# Patient Record
Sex: FEMALE | Employment: OTHER | URBAN - METROPOLITAN AREA
[De-identification: names, ages, dates, MRNs, and addresses within clinical notes are randomized per-mention and may not be internally consistent; named-entity substitution may affect disease eponyms.]

---

## 2018-02-20 ENCOUNTER — TRANSFERRED RECORDS (OUTPATIENT)
Dept: HEALTH INFORMATION MANAGEMENT | Facility: CLINIC | Age: 83
End: 2018-02-20

## 2019-01-21 ENCOUNTER — TRANSFERRED RECORDS (OUTPATIENT)
Dept: HEALTH INFORMATION MANAGEMENT | Facility: CLINIC | Age: 84
End: 2019-01-21

## 2019-01-22 ENCOUNTER — TRANSFERRED RECORDS (OUTPATIENT)
Dept: HEALTH INFORMATION MANAGEMENT | Facility: CLINIC | Age: 84
End: 2019-01-22

## 2019-01-28 ENCOUNTER — PRE VISIT (OUTPATIENT)
Dept: ONCOLOGY | Facility: CLINIC | Age: 84
End: 2019-01-28

## 2019-01-28 NOTE — TELEPHONE ENCOUNTER
"Records (22pages) received from [facility] & faxed to HIM for scanning & also to \"Attn: Dr Mcmanus - [phone]\"  "

## 2019-01-28 NOTE — TELEPHONE ENCOUNTER
RECORDS STATUS - ALL OTHER DIAGNOSIS      RECORDS RECEIVED FROM: [facility] (received via fax)   DATE RECEIVED: 1/23/19   NOTES STATUS DETAILS   OFFICE NOTE from referring provider 1/22/19 In [facility]     OFFICE NOTE from medical oncologist None None     DISCHARGE SUMMARY from hospital None None     DISCHARGE REPORT from the ER None None     OPERATIVE REPORT None None     MEDICATION LIST 1/22/19 In [facility]     CLINICAL TRIAL TREATMENTS TO DATE None None     LABS     PATHOLOGY REPORTS None None     ANYTHING RELATED TO DIAGNOSIS In Epic In Epic     GENONOMIC TESTING     TYPE: None None   IMAGING (NEED IMAGES & REPORT)     CT SCANS None None   MRI 10/05/18 Lumbar  2/8/17 Head [facility]   MAMMO None None   ULTRASOUND 2/8/17 Carotid [facility]   PET None None

## 2019-01-31 ENCOUNTER — ONCOLOGY VISIT (OUTPATIENT)
Dept: ONCOLOGY | Facility: CLINIC | Age: 84
End: 2019-01-31
Attending: INTERNAL MEDICINE
Payer: COMMERCIAL

## 2019-01-31 VITALS
SYSTOLIC BLOOD PRESSURE: 198 MMHG | HEART RATE: 76 BPM | TEMPERATURE: 98.1 F | OXYGEN SATURATION: 97 % | DIASTOLIC BLOOD PRESSURE: 95 MMHG | WEIGHT: 127.2 LBS

## 2019-01-31 DIAGNOSIS — D47.2 MONOCLONAL GAMMOPATHY: ICD-10-CM

## 2019-01-31 DIAGNOSIS — D47.2 MONOCLONAL GAMMOPATHY: Primary | ICD-10-CM

## 2019-01-31 LAB
ALBUMIN SERPL-MCNC: 3.2 G/DL (ref 3.4–5)
ALP SERPL-CCNC: 70 U/L (ref 40–150)
ALT SERPL W P-5'-P-CCNC: 20 U/L (ref 0–50)
ANION GAP SERPL CALCULATED.3IONS-SCNC: 6 MMOL/L (ref 3–14)
AST SERPL W P-5'-P-CCNC: 26 U/L (ref 0–45)
BASOPHILS # BLD AUTO: 0.1 10E9/L (ref 0–0.2)
BASOPHILS NFR BLD AUTO: 1.3 %
BILIRUB SERPL-MCNC: 0.3 MG/DL (ref 0.2–1.3)
BUN SERPL-MCNC: 16 MG/DL (ref 7–30)
CALCIUM SERPL-MCNC: 9 MG/DL (ref 8.5–10.1)
CHLORIDE SERPL-SCNC: 98 MMOL/L (ref 94–109)
CO2 SERPL-SCNC: 31 MMOL/L (ref 20–32)
CREAT SERPL-MCNC: 0.72 MG/DL (ref 0.52–1.04)
DIFFERENTIAL METHOD BLD: ABNORMAL
EOSINOPHIL # BLD AUTO: 0.3 10E9/L (ref 0–0.7)
EOSINOPHIL NFR BLD AUTO: 4.8 %
ERYTHROCYTE [DISTWIDTH] IN BLOOD BY AUTOMATED COUNT: 14.9 % (ref 10–15)
FERRITIN SERPL-MCNC: 8 NG/ML (ref 8–252)
GFR SERPL CREATININE-BSD FRML MDRD: 77 ML/MIN/{1.73_M2}
GLUCOSE SERPL-MCNC: 99 MG/DL (ref 70–99)
HCT VFR BLD AUTO: 34.5 % (ref 35–47)
HGB BLD-MCNC: 10.2 G/DL (ref 11.7–15.7)
IMM GRANULOCYTES # BLD: 0 10E9/L (ref 0–0.4)
IMM GRANULOCYTES NFR BLD: 0.2 %
IRON SATN MFR SERPL: 9 % (ref 15–46)
IRON SERPL-MCNC: 33 UG/DL (ref 35–180)
LDH SERPL L TO P-CCNC: 168 U/L (ref 81–234)
LYMPHOCYTES # BLD AUTO: 1.6 10E9/L (ref 0.8–5.3)
LYMPHOCYTES NFR BLD AUTO: 31 %
MCH RBC QN AUTO: 25.1 PG (ref 26.5–33)
MCHC RBC AUTO-ENTMCNC: 29.6 G/DL (ref 31.5–36.5)
MCV RBC AUTO: 85 FL (ref 78–100)
MONOCYTES # BLD AUTO: 0.4 10E9/L (ref 0–1.3)
MONOCYTES NFR BLD AUTO: 7.9 %
NEUTROPHILS # BLD AUTO: 2.9 10E9/L (ref 1.6–8.3)
NEUTROPHILS NFR BLD AUTO: 54.8 %
NRBC # BLD AUTO: 0 10*3/UL
NRBC BLD AUTO-RTO: 0 /100
PLATELET # BLD AUTO: 267 10E9/L (ref 150–450)
POTASSIUM SERPL-SCNC: 3.7 MMOL/L (ref 3.4–5.3)
PROT SERPL-MCNC: 8.5 G/DL (ref 6.8–8.8)
RBC # BLD AUTO: 4.06 10E12/L (ref 3.8–5.2)
SODIUM SERPL-SCNC: 135 MMOL/L (ref 133–144)
TIBC SERPL-MCNC: 382 UG/DL (ref 240–430)
WBC # BLD AUTO: 5.2 10E9/L (ref 4–11)

## 2019-01-31 PROCEDURE — 83883 ASSAY NEPHELOMETRY NOT SPEC: CPT | Performed by: INTERNAL MEDICINE

## 2019-01-31 PROCEDURE — G0463 HOSPITAL OUTPT CLINIC VISIT: HCPCS | Mod: ZF

## 2019-01-31 PROCEDURE — 82784 ASSAY IGA/IGD/IGG/IGM EACH: CPT | Performed by: INTERNAL MEDICINE

## 2019-01-31 PROCEDURE — 99205 OFFICE O/P NEW HI 60 MIN: CPT | Mod: ZP | Performed by: INTERNAL MEDICINE

## 2019-01-31 PROCEDURE — 82728 ASSAY OF FERRITIN: CPT

## 2019-01-31 PROCEDURE — 83540 ASSAY OF IRON: CPT

## 2019-01-31 PROCEDURE — 83615 LACTATE (LD) (LDH) ENZYME: CPT

## 2019-01-31 PROCEDURE — 85025 COMPLETE CBC W/AUTO DIFF WBC: CPT

## 2019-01-31 PROCEDURE — 83550 IRON BINDING TEST: CPT

## 2019-01-31 PROCEDURE — 84165 PROTEIN E-PHORESIS SERUM: CPT | Performed by: INTERNAL MEDICINE

## 2019-01-31 PROCEDURE — 80053 COMPREHEN METABOLIC PANEL: CPT

## 2019-01-31 PROCEDURE — 00000402 ZZHCL STATISTIC TOTAL PROTEIN: Performed by: INTERNAL MEDICINE

## 2019-01-31 PROCEDURE — 86334 IMMUNOFIX E-PHORESIS SERUM: CPT | Performed by: INTERNAL MEDICINE

## 2019-01-31 RX ORDER — LABETALOL 100 MG/1
100 TABLET, FILM COATED ORAL
COMMUNITY

## 2019-01-31 RX ORDER — MULTIPLE VITAMINS W/ MINERALS TAB 9MG-400MCG
1 TAB ORAL
COMMUNITY

## 2019-01-31 RX ORDER — LATANOPROST 50 UG/ML
1 SOLUTION/ DROPS OPHTHALMIC
COMMUNITY

## 2019-01-31 RX ORDER — DORZOLAMIDE HYDROCHLORIDE AND TIMOLOL MALEATE 20; 5 MG/ML; MG/ML
SOLUTION/ DROPS OPHTHALMIC
COMMUNITY
Start: 2019-01-10

## 2019-01-31 RX ORDER — LOSARTAN POTASSIUM AND HYDROCHLOROTHIAZIDE 12.5; 1 MG/1; MG/1
TABLET ORAL
COMMUNITY
Start: 2019-01-21

## 2019-01-31 RX ORDER — PREDNISONE 5 MG/1
TABLET ORAL
COMMUNITY
Start: 2019-01-15 | End: 2019-06-27

## 2019-01-31 RX ORDER — LORAZEPAM 0.5 MG/1
0.25 TABLET ORAL
COMMUNITY
End: 2019-06-27

## 2019-01-31 SDOH — HEALTH STABILITY: MENTAL HEALTH: HOW OFTEN DO YOU HAVE A DRINK CONTAINING ALCOHOL?: MONTHLY OR LESS

## 2019-01-31 SDOH — HEALTH STABILITY: MENTAL HEALTH: HOW OFTEN DO YOU HAVE 6 OR MORE DRINKS ON ONE OCCASION?: NEVER

## 2019-01-31 SDOH — HEALTH STABILITY: MENTAL HEALTH: HOW MANY STANDARD DRINKS CONTAINING ALCOHOL DO YOU HAVE ON A TYPICAL DAY?: 1 OR 2

## 2019-01-31 ASSESSMENT — PAIN SCALES - GENERAL: PAINLEVEL: MILD PAIN (2)

## 2019-01-31 NOTE — LETTER
RE: Esmer Ruvalcaba  [address] [place] [address]     Dear Colleague,    Thank you for referring your patient, Esmer Ruvalcaba, to the [facility]. Please see a copy of my visit note below.      HEMATOLOGY CLINIC VISIT    Esmer Ruvalcaba is an 83-year-old woman referred by Dr. Maisha Munroe for evaluation of monoclonal gammopathy.  She is accompanied today by her family.    Her past medical history is remarkable primarily for hypertension, hyperlipidemia, coronary artery disease, and esophageal reflux.  In November 2018, she was diagnosed with polymyalgia rheumatica and placed on prednisone.  Although she felt much better after the first week or so, as the prednisone has been tapered she has continued to have persistent symptoms of diffuse aches and pains as well as some fatigue.  She is due to taper off the prednisone completely within the next week.  In addition she was found to be mildly anemic although on review of her labs dating back over the last year or so, her hemoglobin has been stable in the 10.5-11.5 range.  Nonetheless recent testing for underlying causes of anemia included review of her peripheral blood smear which showed rouleaux.  This prompted a serum protein electrophoresis which demonstrated a monoclonal protein measuring 2.0 g/dL.  This was identified as an IgM lambda.  Other than mild anemia, the remainder of her labs are not concerning.  More specifically, she does not have any evidence of renal insufficiency or hypercalcemia.  The rest of her CBC is also normal.    She has diffuse aches and pains but these sound to be long-standing.  Other than this symptom, and fatigue which has been a problem for at least a number of months, she has generally been doing well.  She has been most recently bothered by difficulty with adjustments to her antihypertensive regimen.  Her appetite is not great and she has lost perhaps 5 pounds in the last few months.  She has not had any fevers or  clear evidence of night sweats.  Remainder of a complete review systems is negative.    Physical exam:  She appears to be in generally good health for woman of her age.  She has no palpable lymphadenopathy.  Head and neck, heart, lung, and abdominal exams are all normal.  Liver and spleen are not palpable.  She has no significant lower extremity edema.    Repeat labs are pending at the time of this dictation.      ASSESSMENT / PLAN:  Monoclonal gammopathy (IgM lambda)    Esmer is an 83-year-old woman recently found to have a monoclonal gammopathy.  Given that this is an IgM monoclonal gammopathy, myeloma is unlikely.  A more likely explanation would be a low-grade lymphoma such as Waldenstrom's macroglobulinemia.  Although she has no palpable lymphadenopathy, we will need to proceed with CT scans to further evaluate that.  In addition she likely needs a bone marrow biopsy although if the CT scans show obvious lymphadenopathy, it is possible we might pursue a lymph node biopsy first.    We decided together the best approach here would be for her to get the CT scans done locally and review those results before we make a final decision about the bone marrow biopsy.  I emphasized, however, that I thought a bone marrow biopsy was likely going to be necessary either for diagnostic purposes or for staging.    Once we have all of the laboratory and imaging studies back, we will arrange to see her back to review the findings and determine her diagnosis, as well as the most appropriate treatment options.    During the course of our discussion, she and her family asked a number of good questions, appeared to have a clear understanding of all of things we reviewed, and were all in agreement with the plan outlined above.    Total time 60 minutes, all in counseling and coordination of care.    Tyler Mcmanus MD  Associate Professor of Medicine  Division of Hematology, Oncology, and Transplantation  Director, [facility]

## 2019-01-31 NOTE — NURSING NOTE
Oncology Rooming Note    January 31, 2019 4:44 PM   Esmer Ruvalcaba is a 83 year old female who presents for:    Chief Complaint   Patient presents with     Oncology Clinic Visit     NEW; Multiple Myeloma     Initial Vitals: BP (!) 198/95   Pulse 76   Temp 98.1  F (36.7  C) (Oral)   Wt 57.7 kg (127 lb 3.2 oz)   SpO2 97%  There is no height or weight on file to calculate BMI. There is no height or weight on file to calculate BSA.  Mild Pain (2) Comment: Data Unavailable   No LMP recorded.  Allergies reviewed: Yes  Medications reviewed: Yes    Medications: Medication refills not needed today.  Pharmacy name entered into [employer]: [facility] [address] [place] [address] - [address]    Clinical concerns: No Concerns Yonathan was NOT notified.    10 minutes for nursing intake (face to face time)     An Carballo MA

## 2019-02-01 ENCOUNTER — TELEPHONE (OUTPATIENT)
Dept: ONCOLOGY | Facility: CLINIC | Age: 84
End: 2019-02-01

## 2019-02-01 LAB
ALBUMIN SERPL ELPH-MCNC: 3.9 G/DL (ref 3.7–5.1)
ALPHA1 GLOB SERPL ELPH-MCNC: 0.3 G/DL (ref 0.2–0.4)
ALPHA2 GLOB SERPL ELPH-MCNC: 0.8 G/DL (ref 0.5–0.9)
B-GLOBULIN SERPL ELPH-MCNC: 2.6 G/DL (ref 0.6–1)
GAMMA GLOB SERPL ELPH-MCNC: 0.6 G/DL (ref 0.7–1.6)
KAPPA LC UR-MCNC: 1.03 MG/DL (ref 0.33–1.94)
KAPPA LC/LAMBDA SER: 0.08 {RATIO} (ref 0.26–1.65)
LAMBDA LC SERPL-MCNC: 13.2 MG/DL (ref 0.57–2.63)
M PROTEIN SERPL ELPH-MCNC: 2 G/DL
PROT PATTERN SERPL ELPH-IMP: ABNORMAL

## 2019-02-01 NOTE — TELEPHONE ENCOUNTER
Vania from [facility] called and stated they need demographics and insurance information for pt. If PA is needed for CT would need us to initiate that and get approval before they will schedule pt. Please call back at [phone] once completed, order on hold until they hear back.

## 2019-02-01 NOTE — TELEPHONE ENCOUNTER
Face Sheet & Cmp lab faxed to Vania at [facility] as requested. Also, per [employer] insurance rep, a prior auth for CT is NOT required. This information was also shared w/ Vania at [facility].

## 2019-02-01 NOTE — TELEPHONE ENCOUNTER
As requested by Dr Mcmanus, CT CAP orders sent to fax# [phone] today. Request to complete CT within 1 week & to fax final CT report to MD at fax# [phone]. If images are required by MD, will request at a later date.

## 2019-02-03 NOTE — PROGRESS NOTES
HEMATOLOGY CLINIC VISIT    Esmer Ruvalcaba is an 83-year-old woman referred by Dr. Maisha Munroe for evaluation of monoclonal gammopathy.  She is accompanied today by her family.    Her past medical history is remarkable primarily for hypertension, hyperlipidemia, coronary artery disease, and esophageal reflux.  In November 2018, she was diagnosed with polymyalgia rheumatica and placed on prednisone.  Although she felt much better after the first week or so, as the prednisone has been tapered she has continued to have persistent symptoms of diffuse aches and pains as well as some fatigue.  She is due to taper off the prednisone completely within the next week.  In addition she was found to be mildly anemic although on review of her labs dating back over the last year or so, her hemoglobin has been stable in the 10.5-11.5 range.  Nonetheless recent testing for underlying causes of anemia included review of her peripheral blood smear which showed rouleaux.  This prompted a serum protein electrophoresis which demonstrated a monoclonal protein measuring 2.0 g/dL.  This was identified as an IgM lambda.  Other than mild anemia, the remainder of her labs are not concerning.  More specifically, she does not have any evidence of renal insufficiency or hypercalcemia.  The rest of her CBC is also normal.    She has diffuse aches and pains but these sound to be long-standing.  Other than this symptom, and fatigue which has been a problem for at least a number of months, she has generally been doing well.  She has been most recently bothered by difficulty with adjustments to her antihypertensive regimen.  Her appetite is not great and she has lost perhaps 5 pounds in the last few months.  She has not had any fevers or clear evidence of night sweats.  Remainder of a complete review systems is negative.    Physical exam:  She appears to be in generally good health for woman of her age.  She has no palpable lymphadenopathy.  Head and  neck, heart, lung, and abdominal exams are all normal.  Liver and spleen are not palpable.  She has no significant lower extremity edema.    Repeat labs are pending at the time of this dictation.      ASSESSMENT / PLAN:  Monoclonal gammopathy (IgM lambda)    Esmer is an 83-year-old woman recently found to have a monoclonal gammopathy.  Given that this is an IgM monoclonal gammopathy, myeloma is unlikely.  A more likely explanation would be a low-grade lymphoma such as Waldenstrom's macroglobulinemia.  Although she has no palpable lymphadenopathy, we will need to proceed with CT scans to further evaluate that.  In addition she likely needs a bone marrow biopsy although if the CT scans show obvious lymphadenopathy, it is possible we might pursue a lymph node biopsy first.    We decided together the best approach here would be for her to get the CT scans done locally and review those results before we make a final decision about the bone marrow biopsy.  I emphasized, however, that I thought a bone marrow biopsy was likely going to be necessary either for diagnostic purposes or for staging.    Once we have all of the laboratory and imaging studies back, we will arrange to see her back to review the findings and determine her diagnosis, as well as the most appropriate treatment options.    During the course of our discussion, she and her family asked a number of good questions, appeared to have a clear understanding of all of things we reviewed, and were all in agreement with the plan outlined above.      Total time 60 minutes, all in counseling and coordination of care.        Tyler Mcmanus MD  Associate Professor of Medicine  Division of Hematology, Oncology, and Transplantation  Director, [facility]ot[facility]

## 2019-02-04 ENCOUNTER — TRANSFERRED RECORDS (OUTPATIENT)
Dept: HEALTH INFORMATION MANAGEMENT | Facility: CLINIC | Age: 84
End: 2019-02-04

## 2019-02-04 LAB
IGA SERPL-MCNC: 148 MG/DL (ref 70–380)
IGG SERPL-MCNC: 549 MG/DL (ref 695–1620)
IGM SERPL-MCNC: 2130 MG/DL (ref 60–265)
PROT PATTERN SERPL IFE-IMP: ABNORMAL

## 2019-02-04 NOTE — TELEPHONE ENCOUNTER
[facility] phoned our clinic, per protocol with Imaging at their location,  CT is from with contrast only.   Dr. Mcmanus is ok with this.

## 2019-02-06 ENCOUNTER — TELEPHONE (OUTPATIENT)
Dept: ONCOLOGY | Facility: CLINIC | Age: 84
End: 2019-02-06

## 2019-02-06 DIAGNOSIS — D47.2 MONOCLONAL GAMMOPATHY: Primary | ICD-10-CM

## 2019-02-06 NOTE — TELEPHONE ENCOUNTER
Rani calls back to discuss the plan below. We reviewed lab results & CT results (no adenopathy) and MD's plan to proceed w/ BMBX. Rani states that pt is agreeable to this plan; Scheduling will contact Rani in the next day or so to finalize appts. Rani verbalized understanding of this plan.

## 2019-02-06 NOTE — TELEPHONE ENCOUNTER
I called pt's home# and left a general message requesting a call back so we may discuss Dr Mcmanus's recommendations.     Plan to discuss that pt's CT done at [facility] shows no obvious adenopathy, thus no site to biopsy. MD recommends proceeding with bone marrow biopsy to further investigate pt's monoclonal gammopathy. I left general message to call back to discuss this plan; will task Scheduling now to arrange bmbx w/ MD follow up a few days after.    I called Rani's cell (pt's daughter) but her mailbox is full & I was not able to leave a voicemail. Awaiting call back.

## 2019-02-06 NOTE — TELEPHONE ENCOUNTER
----- Message from Tyler Mcmanus MD sent at 2/6/2019  6:01 AM CST -----  Regarding: RE: CT results  Labs confirmed what we already knew.  CT scan shows no enlarged lymph nodes -- nothing to biopsy.  So, yes... we need to do the bone marrow biopsy.  I put in the orders.    MTR      ----- Message -----  From: Abby Cordoba LPN  Sent: 2/5/2019  10:07 AM  To: Tyler Mcmanus MD  Subject: CT results                                       The results are back, scanned in,  It says MRI, but it really the CT report.  Let me know if you want the images too.    Daughter already calling, would like to know how the additional bloodwork from last week turned out.    Also questioned her sed rate being high, and wondering if that is associated with the probable lymphoma.  I don't see any sed rates tho.      I am guessing you will want the BMBX, but I will need you to place the orders.     Let me know

## 2019-02-14 ENCOUNTER — APPOINTMENT (OUTPATIENT)
Dept: LAB | Facility: CLINIC | Age: 84
End: 2019-02-14
Attending: INTERNAL MEDICINE
Payer: COMMERCIAL

## 2019-02-14 ENCOUNTER — OFFICE VISIT (OUTPATIENT)
Dept: ONCOLOGY | Facility: CLINIC | Age: 84
End: 2019-02-14
Attending: INTERNAL MEDICINE
Payer: COMMERCIAL

## 2019-02-14 VITALS
HEART RATE: 68 BPM | SYSTOLIC BLOOD PRESSURE: 149 MMHG | TEMPERATURE: 98.2 F | DIASTOLIC BLOOD PRESSURE: 87 MMHG | OXYGEN SATURATION: 98 % | RESPIRATION RATE: 16 BRPM | WEIGHT: 129 LBS

## 2019-02-14 DIAGNOSIS — D47.2 MONOCLONAL GAMMOPATHY: ICD-10-CM

## 2019-02-14 LAB
BASOPHILS # BLD AUTO: 0.1 10E9/L (ref 0–0.2)
BASOPHILS NFR BLD AUTO: 1.1 %
DIFFERENTIAL METHOD BLD: ABNORMAL
EOSINOPHIL # BLD AUTO: 0.3 10E9/L (ref 0–0.7)
EOSINOPHIL NFR BLD AUTO: 5.2 %
ERYTHROCYTE [DISTWIDTH] IN BLOOD BY AUTOMATED COUNT: 14.9 % (ref 10–15)
HCT VFR BLD AUTO: 32.9 % (ref 35–47)
HGB BLD-MCNC: 10.3 G/DL (ref 11.7–15.7)
IMM GRANULOCYTES # BLD: 0 10E9/L (ref 0–0.4)
IMM GRANULOCYTES NFR BLD: 0.2 %
LYMPHOCYTES # BLD AUTO: 1.1 10E9/L (ref 0.8–5.3)
LYMPHOCYTES NFR BLD AUTO: 20.4 %
MCH RBC QN AUTO: 25.9 PG (ref 26.5–33)
MCHC RBC AUTO-ENTMCNC: 31.3 G/DL (ref 31.5–36.5)
MCV RBC AUTO: 83 FL (ref 78–100)
MONOCYTES # BLD AUTO: 0.5 10E9/L (ref 0–1.3)
MONOCYTES NFR BLD AUTO: 8.7 %
NEUTROPHILS # BLD AUTO: 3.5 10E9/L (ref 1.6–8.3)
NEUTROPHILS NFR BLD AUTO: 64.4 %
NRBC # BLD AUTO: 0 10*3/UL
NRBC BLD AUTO-RTO: 0 /100
PLATELET # BLD AUTO: 257 10E9/L (ref 150–450)
RBC # BLD AUTO: 3.98 10E12/L (ref 3.8–5.2)
WBC # BLD AUTO: 5.4 10E9/L (ref 4–11)

## 2019-02-14 PROCEDURE — G0463 HOSPITAL OUTPT CLINIC VISIT: HCPCS | Mod: 25

## 2019-02-14 PROCEDURE — 38222 DX BONE MARROW BX & ASPIR: CPT | Mod: ZF | Performed by: NURSE PRACTITIONER

## 2019-02-14 PROCEDURE — 88342 IMHCHEM/IMCYTCHM 1ST ANTB: CPT | Performed by: INTERNAL MEDICINE

## 2019-02-14 PROCEDURE — 00000161 ZZHCL STATISTIC H-SPHEME PROCESS B/S: Performed by: INTERNAL MEDICINE

## 2019-02-14 PROCEDURE — 40000951 ZZHCL STATISTIC BONE MARROW INTERP TC 85097: Performed by: INTERNAL MEDICINE

## 2019-02-14 PROCEDURE — 25000128 H RX IP 250 OP 636: Mod: ZF | Performed by: NURSE PRACTITIONER

## 2019-02-14 PROCEDURE — 88280 CHROMOSOME KARYOTYPE STUDY: CPT | Performed by: INTERNAL MEDICINE

## 2019-02-14 PROCEDURE — 88161 CYTOPATH SMEAR OTHER SOURCE: CPT | Mod: XU | Performed by: INTERNAL MEDICINE

## 2019-02-14 PROCEDURE — 88237 TISSUE CULTURE BONE MARROW: CPT | Performed by: INTERNAL MEDICINE

## 2019-02-14 PROCEDURE — 88305 TISSUE EXAM BY PATHOLOGIST: CPT | Performed by: INTERNAL MEDICINE

## 2019-02-14 PROCEDURE — 88184 FLOWCYTOMETRY/ TC 1 MARKER: CPT | Performed by: INTERNAL MEDICINE

## 2019-02-14 PROCEDURE — 96374 THER/PROPH/DIAG INJ IV PUSH: CPT

## 2019-02-14 PROCEDURE — 88271 CYTOGENETICS DNA PROBE: CPT | Performed by: INTERNAL MEDICINE

## 2019-02-14 PROCEDURE — 88185 FLOWCYTOMETRY/TC ADD-ON: CPT | Performed by: INTERNAL MEDICINE

## 2019-02-14 PROCEDURE — 88264 CHROMOSOME ANALYSIS 20-25: CPT | Performed by: INTERNAL MEDICINE

## 2019-02-14 PROCEDURE — 36592 COLLECT BLOOD FROM PICC: CPT

## 2019-02-14 PROCEDURE — 88275 CYTOGENETICS 100-300: CPT | Performed by: INTERNAL MEDICINE

## 2019-02-14 PROCEDURE — 85025 COMPLETE CBC W/AUTO DIFF WBC: CPT | Performed by: NURSE PRACTITIONER

## 2019-02-14 PROCEDURE — 40000795 ZZHCL STATISTIC DNA PROCESS AND HOLD: Performed by: INTERNAL MEDICINE

## 2019-02-14 PROCEDURE — 88311 DECALCIFY TISSUE: CPT | Performed by: INTERNAL MEDICINE

## 2019-02-14 PROCEDURE — 88341 IMHCHEM/IMCYTCHM EA ADD ANTB: CPT | Performed by: INTERNAL MEDICINE

## 2019-02-14 PROCEDURE — 40000611 ZZHCL STATISTIC MORPHOLOGY W/INTERP HEMEPATH TC 85060: Performed by: INTERNAL MEDICINE

## 2019-02-14 PROCEDURE — 40001004 ZZHCL STATISTIC FLOW INT 9-15 ABY TC 88188: Performed by: INTERNAL MEDICINE

## 2019-02-14 PROCEDURE — 88313 SPECIAL STAINS GROUP 2: CPT | Performed by: INTERNAL MEDICINE

## 2019-02-14 PROCEDURE — 40000424 ZZHCL STATISTIC BONE MARROW CORE PERF TC 38221: Performed by: INTERNAL MEDICINE

## 2019-02-14 PROCEDURE — 00000058 ZZHCL STATISTIC BONE MARROW ASP PERF TC 38220: Performed by: INTERNAL MEDICINE

## 2019-02-14 RX ADMIN — MIDAZOLAM 0.5 MG: 1 INJECTION INTRAMUSCULAR; INTRAVENOUS at 11:10

## 2019-02-14 ASSESSMENT — PAIN SCALES - GENERAL
PAINLEVEL: SEVERE PAIN (7)
PAINLEVEL: NO PAIN (0)

## 2019-02-14 NOTE — LETTER
2/14/2019     RE: Esmer Ruvalcaba  [address]  [place] [address]     Dear Colleague,    Thank you for referring your patient, Esmer Ruvalcaba, to the [facility]. Please see a copy of my visit note below.    .BMT ONC Adult Bone Marrow Biopsy Procedure Note  February 14, 2019  /76 (BP Location: Right arm, Patient Position: Sitting, Cuff Size: Adult Regular)   Pulse 77   Temp 97.6  F (36.4  C) (Oral)   Resp 16   Wt 58.5 kg (129 lb)   SpO2 99%      Learning needs assessment complete within 12 months? YES    DIAGNOSIS: Monoclonal gammopathy     PROCEDURE: Unilateral Bone Marrow Biopsy and Unilateral Aspirate    LOCATION: [facility]    Patient s identification was positively verified by verbal identification and invasive procedure safety checklist was completed. Informed consent was obtained. Following the administration of Midazolam (0.5 mg) as pre-medication, patient was placed in the prone position and prepped and draped in a sterile manner. Approximately 10 cc of 1% Lidocaine was used over the right posterior iliac spine. Following this a 3 mm incision was made. Trephine bone marrow core(s) was (were) obtained from the [facility]. Bone marrow aspirates were obtained from the [facility]. Aspirates were sent for morphology, immunophenotyping, cytogenetics, molecular diagnostics  and research studies. A total of approximately 20 ml of marrow was aspirated. Following this procedure a sterile dressing was applied to the bone marrow biopsy site(s). The patient was placed in the supine position to maintain pressure on the biopsy site. Post-procedure wound care instructions were given.     Complications: NO      Post-procedural pain assessment: 0 out of 10 on the numeric pain rating scale.     Interventions: NO    Procedure performed by: Lisette Millard CNP    Again, thank you for allowing me to participate in the care of your patient.      Sincerely,    SHAE Ross CNP

## 2019-02-14 NOTE — NURSING NOTE
Oncology Rooming Note    February 14, 2019 9:49 AM   Esmer Ruvalcaba is a 83 year old female who presents for:    Chief Complaint   Patient presents with     RECHECK     BMBX     Initial Vitals: /76 (BP Location: Right arm, Patient Position: Sitting, Cuff Size: Adult Regular)   Pulse 77   Temp 97.6  F (36.4  C) (Oral)   Resp 16   Wt 58.5 kg (129 lb)   SpO2 99%  There is no height or weight on file to calculate BMI. There is no height or weight on file to calculate BSA.  Severe Pain (7) Comment: Data Unavailable   No LMP recorded.  Allergies reviewed: Yes  Medications reviewed: Yes    Medications: Medication refills not needed today.  Pharmacy name entered into [employer]: [id number] - [place], [place] - [address]    Clinical concerns: none      8 minutes for nursing intake (face to face time)     Nedra CONOR Red

## 2019-02-14 NOTE — PROGRESS NOTES
.BMT ONC Adult Bone Marrow Biopsy Procedure Note  February 14, 2019  /76 (BP Location: Right arm, Patient Position: Sitting, Cuff Size: Adult Regular)   Pulse 77   Temp 97.6  F (36.4  C) (Oral)   Resp 16   Wt 58.5 kg (129 lb)   SpO2 99%      Learning needs assessment complete within 12 months? YES    DIAGNOSIS: Monoclonal gammopathy     PROCEDURE: Unilateral Bone Marrow Biopsy and Unilateral Aspirate    LOCATION: [facility]    Patient s identification was positively verified by verbal identification and invasive procedure safety checklist was completed. Informed consent was obtained. Following the administration of Midazolam (0.5 mg) as pre-medication, patient was placed in the prone position and prepped and draped in a sterile manner. Approximately 10 cc of 1% Lidocaine was used over the right posterior iliac spine. Following this a 3 mm incision was made. Trephine bone marrow core(s) was (were) obtained from the [facility]. Bone marrow aspirates were obtained from the [facility]. Aspirates were sent for morphology, immunophenotyping, cytogenetics, molecular diagnostics  and research studies. A total of approximately 20 ml of marrow was aspirated. Following this procedure a sterile dressing was applied to the bone marrow biopsy site(s). The patient was placed in the supine position to maintain pressure on the biopsy site. Post-procedure wound care instructions were given.     Complications: NO      Post-procedural pain assessment: 0 out of 10 on the numeric pain rating scale.     Interventions: NO    Procedure performed by: Lisette Millard CNP

## 2019-02-14 NOTE — NURSING NOTE
BMT Teaching Flowsheet    Esmer Ruvalcaba is a 83 year old female  The encounter diagnosis was Monoclonal gammopathy.    Teaching Topic: pre and post care for bone marrow biopsy including signs and symptoms of infection, pain control, and site care. Patient and daughter knows to keep site clean, dry, and intact for 24 hours. They know temp of 100.5 or higher, pain, drainage, edema, ect are all signs of infection and should be seen immediately. Also, they know they can ice the site as well.  All questions and concerns were answered for the patient and her daughter.     Person(s) involved in teaching: Patient  Motivation Level  Asks Questions: Yes  Eager to Learn: Yes  Cooperative: Yes  Receptive (willing/able to accept information): Yes  Any cultural factors/Rastafarian beliefs that may influence understanding or compliance? No    Patient demonstrates understanding of the following:  - Reason for the appointment, diagnosis and treatment plan: Yes  - Knowledge of proper use of medications and conditions for which they are ordered (with special attention to potential side effects or drug interactions): Yes  - Which situations necessitate calling provider and whom to contact: Yes    Teaching concerns addressed: all questions and concerns were answered    Proper use and care of (medical equipment, care aids, etc.) Yes  Pain management techniques: Yes  Patient instructed on hand hygiene: Yes  How and/when to access community resources: Yes    Instructional Materials Used/Given: written and verbal instructions were given to the patient and her daughter        Patient was given Iv Versad per provider order with provider at bedside, call light within reach, bedside wheels locked, and rails up.    Patient supine for 30 minutes following biopsy. After 30 minutes, dressing clean, dry and intact. Vital signs stable. See DOC flow sheets for details. Left ambulatory with family member.    Drug Administration Record    Drug Name:  Versad  Dose: 0.5 mg  Route Administered: IV  NDC#: 7222-2823-12  Amount of waste (mL): 1.5 ml  Reason for waste: provider order

## 2019-02-14 NOTE — NURSING NOTE
Patient had vitals done post BMBX vitas were stable, went over discharge instructions. Patient denied any pain IV was de accessed from the right arm patient tolerated well and understood discharge instructions and was discharged home.     Sylwia Corado MA

## 2019-02-15 LAB — COPATH REPORT: NORMAL

## 2019-02-18 LAB
COPATH REPORT: NORMAL
COPATH REPORT: NORMAL

## 2019-02-22 ENCOUNTER — CARE COORDINATION (OUTPATIENT)
Dept: ONCOLOGY | Facility: CLINIC | Age: 84
End: 2019-02-22

## 2019-02-22 NOTE — PROGRESS NOTES
Spoke with Daughter Rani several times regarding results of bone marrow biopsy, on going pain and plan. Reviewed with daughter the info provide below.    Per Dr. Mcmanus:  The CT scans showed no evidence of lymphoma.     Bone marrow biopsy also shows no evidence of lymphoma or myeloma.     Thus, at this point she has dx of MGUS (extra antibody protein in blood, that doesn't appear to be causing any specific problems).     Unresolved issue:   Labs show that she is iron deficient.  She needs her GI tract evaluated (both upper and lower endoscopies), as this is assumed to be from GI blood loss until proven otherwise.  It is possible that we may find lymphoma in her GI tract, which could be the source of the extra antibody protein, and the iron deficiency.  My plan was to contact the local primary MD to see if she could arrange for the endoscopies, unless patient / family want them done here.     Situation and Plan:  Rani reports Esmer is in a lot of pain but refuses to take anything for it. Reports Ibuprofen causes upset stomach. She was recently on prednisone as prescribed by her PCP for treatment of  Polymyalgia rheumatica.  It was reported there was some improved in her symptoms at the time.  Esmer has been reluctant to take steroids as they make her wired, she also really thought she may have lymphoma and wanted to pursue that treatment/ Diagnosis as priority.  Daughter tells me about SED rate being high and overall concern regarding pain.  Encouraged her to follow up with PCP for management, from Dr. Mcmanus's standpoint there is no evidence to think the MGUS is causing pain.    Discussed the iron Deficiency issue.  Esmer had a colonoscopy in 2015 and again in 2/2018 along with a upper GI endoscopy in 2/2018.  Both exams show nothing of concern. Will discuss with Dr. Mcmanus if repeat scopes are need being that recent ones were just a year ago.  If he still wants to pursue, family would like to have her vs local  provider.     Daughter concerned of mothers QOL with this pain she is in.  Esmer was to fly to [place] but daughter is certain she will cancel due to pain.  Reports Mom was active just a few months ago and now just in not herself.    Follow-up:  Will discuss with Dr. Mcmanus need for repeated scopes vs treatment of iron deficiency.

## 2019-03-11 LAB — COPATH REPORT: NORMAL

## 2019-03-19 LAB — COPATH REPORT: NORMAL

## 2019-03-28 ENCOUNTER — ONCOLOGY VISIT (OUTPATIENT)
Dept: ONCOLOGY | Facility: CLINIC | Age: 84
End: 2019-03-28
Attending: INTERNAL MEDICINE
Payer: COMMERCIAL

## 2019-03-28 VITALS
HEART RATE: 60 BPM | WEIGHT: 127 LBS | TEMPERATURE: 97.9 F | SYSTOLIC BLOOD PRESSURE: 184 MMHG | DIASTOLIC BLOOD PRESSURE: 85 MMHG | OXYGEN SATURATION: 98 %

## 2019-03-28 DIAGNOSIS — D47.2 MONOCLONAL GAMMOPATHY: Primary | ICD-10-CM

## 2019-03-28 PROCEDURE — 99215 OFFICE O/P EST HI 40 MIN: CPT | Mod: ZP | Performed by: INTERNAL MEDICINE

## 2019-03-28 PROCEDURE — G0463 HOSPITAL OUTPT CLINIC VISIT: HCPCS | Mod: ZF

## 2019-03-28 ASSESSMENT — PAIN SCALES - GENERAL: PAINLEVEL: NO PAIN (0)

## 2019-03-28 NOTE — PROGRESS NOTES
HEMATOLOGY CLINIC VISIT    Esmer Ruvalcaba is an 84-year-old woman who returns today for follow-up of IgM lambda monoclonal gammopathy, and to discuss the results of her recent CT scans and bone marrow biopsy.  Please see my initial consultation from January 31, 2019 for details of her history.  She is accompanied today by 2 daughters.    As part of her evaluation, we recommended CT scans and a bone marrow biopsy.  The CT scans were done through her local clinic and showed no evidence of lymphadenopathy or hepatosplenomegaly.  A 1.3 x 1.3 cm groundglass opacity in the left upper lobe was noted, however.  The bone marrow biopsy was performed here on February 14, 2019.  Her marrow was found to be 40% cellular with normal trilineage hematopoiesis and less than 3% polytypic plasma cells.  Flow cytometry revealed no evidence of a plasma cell neoplasm or B-cell lymphoma.  FISH and cytogenetic testing were also normal.    In addition to the monoclonal gammopathy, we also found that she had a mild normocytic anemia,  along with iron deficiency.  Review of labs from her primary clinic indicate that her hemoglobin has been stable over the last year, however.  She had both upper and lower endoscopies performed in February 2018.  The colonoscopy was normal.  Upper endoscopy revealed diffuse gastritis in the stomach.  Biopsies showed mild nonspecific reactive changes but no evidence of dysplasia, malignancy, or H. pylori infection.  She has been taking over-the-counter H2 blocker but continues to have frequent heartburn symptoms.    She returns today feeling much better than when I previously saw her.  Her diffuse musculoskeletal aches and pains have all largely resolved.  She was able to spend some time in [place].  She has no new symptoms to report.  She denies fevers, chills, night sweats.  Her appetite is good and her weight has been stable to slightly increased.  She has chronic heartburn and gastritis symptoms but nothing  new.  No change in her bowel habits.  She has not noticed any dark tarry stools or blood in her stools.  The remainder of her complete review of systems is negative.    She looks well.  Detailed exam was not performed today.    No new labs were obtained here today.      ASSESSMENT / PLAN:  1.  IgM lambda monoclonal gammopathy  2.  1.3 x 1.3 cm groundglass opacity in the left upper lobe seen on CT scan 2/4/19  3.  Iron deficiency anemia likely secondary to chronic gastritis    I explained to Esmer and her daughters that we have not found any evidence of lymphoma, myeloma, or other hematologic malignancy as a cause of her monoclonal gammopathy.  Thus, this is best characterized as an MGUS.  We discussed that the amount of monoclonal protein and that the IgM subtype are a bit unusual for benign MGUS but again, no evidence of an overt malignant process has been found thus far.  We need to continue monitoring this and will plan to recheck her labs in about 2 months.    The 1.3 x 1.3 cm groundglass opacity in the left upper lobe seen on CT scan in early February is likely infectious or inflammatory in nature, but a repeat CT scan in early May would be appropriate for follow-up.  I will defer this to her primary physician.    Her iron deficiency anemia is likely secondary to chronic gastritis.  Given that she had upper and lower endoscopy a year ago with no other findings noted, and the fact that her hemoglobin has not changed in the last year, I do not think there is a new process going on here.  We discussed the possibility of a GI lymphoma but overall that explanation does not fit the clinical picture very well here.  Thus, I recommend more aggressive treatment of her gastritis symptoms.  Would also be reasonable to start her on a daily oral iron tablet and recheck labs in 2-3 months.  Again I will defer this to her primary physician.    We will plan to see her back in approximately 2 months with repeat labs prior.  She  will call with new questions or concerns in the meantime.      Total time 40 minutes, all in counseling and coordination of care.        Tyler Mcmanus MD  Associate Professor of Medicine  Division of Hematology, Oncology, and Transplantation  Director, [facility]

## 2019-03-28 NOTE — LETTER
3/28/2019       RE: Esmer Ruvalcaba  [address] [place] [address]     Dear Colleague,    Thank you for referring your patient, Esmer Ruvalcaba, to the [facility]. Please see a copy of my visit note below.      HEMATOLOGY CLINIC VISIT    Esmer Ruvalcaba is an 84-year-old woman who returns today for follow-up of IgM lambda monoclonal gammopathy, and to discuss the results of her recent CT scans and bone marrow biopsy.  Please see my initial consultation from January 31, 2019 for details of her history.  She is accompanied today by 2 daughters.    As part of her evaluation, we recommended CT scans and a bone marrow biopsy.  The CT scans were done through her local clinic and showed no evidence of lymphadenopathy or hepatosplenomegaly.  A 1.3 x 1.3 cm groundglass opacity in the left upper lobe was noted, however.  The bone marrow biopsy was performed here on February 14, 2019.  Her marrow was found to be 40% cellular with normal trilineage hematopoiesis and less than 3% polytypic plasma cells.  Flow cytometry revealed no evidence of a plasma cell neoplasm or B-cell lymphoma.  FISH and cytogenetic testing were also normal.    In addition to the monoclonal gammopathy, we also found that she had a mild normocytic anemia,  along with iron deficiency.  Review of labs from her primary clinic indicate that her hemoglobin has been stable over the last year, however.  She had both upper and lower endoscopies performed in February 2018.  The colonoscopy was normal.  Upper endoscopy revealed diffuse gastritis in the stomach.  Biopsies showed mild nonspecific reactive changes but no evidence of dysplasia, malignancy, or H. pylori infection.  She has been taking over-the-counter H2 blocker but continues to have frequent heartburn symptoms.    She returns today feeling much better than when I previously saw her.  Her diffuse musculoskeletal aches and pains have all largely resolved.  She was able to spend some  time in [place].  She has no new symptoms to report.  She denies fevers, chills, night sweats.  Her appetite is good and her weight has been stable to slightly increased.  She has chronic heartburn and gastritis symptoms but nothing new.  No change in her bowel habits.  She has not noticed any dark tarry stools or blood in her stools.  The remainder of her complete review of systems is negative.    She looks well.  Detailed exam was not performed today.    No new labs were obtained here today.      ASSESSMENT / PLAN:  1.  IgM lambda monoclonal gammopathy  2.  1.3 x 1.3 cm groundglass opacity in the left upper lobe seen on CT scan 2/4/19  3.  Iron deficiency anemia likely secondary to chronic gastritis    I explained to Esmer and her daughters that we have not found any evidence of lymphoma, myeloma, or other hematologic malignancy as a cause of her monoclonal gammopathy.  Thus, this is best characterized as an MGUS.  We discussed that the amount of monoclonal protein and that the IgM subtype are a bit unusual for benign MGUS but again, no evidence of an overt malignant process has been found thus far.  We need to continue monitoring this and will plan to recheck her labs in about 2 months.    The 1.3 x 1.3 cm groundglass opacity in the left upper lobe seen on CT scan in early February is likely infectious or inflammatory in nature, but a repeat CT scan in early May would be appropriate for follow-up.  I will defer this to her primary physician.    Her iron deficiency anemia is likely secondary to chronic gastritis.  Given that she had upper and lower endoscopy a year ago with no other findings noted, and the fact that her hemoglobin has not changed in the last year, I do not think there is a new process going on here.  We discussed the possibility of a GI lymphoma but overall that explanation does not fit the clinical picture very well here.  Thus, I recommend more aggressive treatment of her gastritis symptoms.   Would also be reasonable to start her on a daily oral iron tablet and recheck labs in 2-3 months.  Again I will defer this to her primary physician.    We will plan to see her back in approximately 2 months with repeat labs prior.  She will call with new questions or concerns in the meantime.      Total time 40 minutes, all in counseling and coordination of care.        Tyler Mcmanus MD  Associate Professor of Medicine  Division of Hematology, Oncology, and Transplantation  Director, [facility]

## 2019-05-23 ENCOUNTER — TELEPHONE (OUTPATIENT)
Dept: ONCOLOGY | Facility: CLINIC | Age: 84
End: 2019-05-23

## 2019-06-19 ENCOUNTER — TRANSFERRED RECORDS (OUTPATIENT)
Dept: HEALTH INFORMATION MANAGEMENT | Facility: CLINIC | Age: 84
End: 2019-06-19

## 2019-06-27 ENCOUNTER — ONCOLOGY VISIT (OUTPATIENT)
Dept: ONCOLOGY | Facility: CLINIC | Age: 84
End: 2019-06-27
Attending: INTERNAL MEDICINE
Payer: COMMERCIAL

## 2019-06-27 VITALS
BODY MASS INDEX: 24.35 KG/M2 | HEIGHT: 61 IN | OXYGEN SATURATION: 97 % | DIASTOLIC BLOOD PRESSURE: 86 MMHG | WEIGHT: 129 LBS | TEMPERATURE: 97.9 F | RESPIRATION RATE: 16 BRPM | SYSTOLIC BLOOD PRESSURE: 160 MMHG | HEART RATE: 79 BPM

## 2019-06-27 DIAGNOSIS — D47.2 MONOCLONAL GAMMOPATHY: Primary | ICD-10-CM

## 2019-06-27 DIAGNOSIS — D50.0 IRON DEFICIENCY ANEMIA DUE TO CHRONIC BLOOD LOSS: ICD-10-CM

## 2019-06-27 PROCEDURE — G0463 HOSPITAL OUTPT CLINIC VISIT: HCPCS

## 2019-06-27 PROCEDURE — 40000114 ZZH STATISTIC NO CHARGE CLINIC VISIT

## 2019-06-27 PROCEDURE — G0463 HOSPITAL OUTPT CLINIC VISIT: HCPCS | Mod: ZF

## 2019-06-27 PROCEDURE — 99215 OFFICE O/P EST HI 40 MIN: CPT | Mod: ZP | Performed by: INTERNAL MEDICINE

## 2019-06-27 RX ORDER — ERGOCALCIFEROL 1.25 MG/1
CAPSULE, LIQUID FILLED ORAL
COMMUNITY
End: 2019-06-27

## 2019-06-27 RX ORDER — PREDNISOLONE ACETATE 10 MG/ML
1 SUSPENSION/ DROPS OPHTHALMIC
COMMUNITY

## 2019-06-27 RX ORDER — FLUOROURACIL 50 MG/ML
SOLUTION TOPICAL
COMMUNITY

## 2019-06-27 RX ORDER — SILICONE ADHESIVE 1.5" X 3"
1-2 SHEET (EA) TOPICAL
COMMUNITY
Start: 2019-05-10 | End: 2019-07-09

## 2019-06-27 RX ORDER — GATIFLOXACIN 5 MG/ML
SOLUTION/ DROPS OPHTHALMIC
Refills: 1 | COMMUNITY
Start: 2019-04-22

## 2019-06-27 RX ORDER — EZETIMIBE 10 MG/1
10 TABLET ORAL
COMMUNITY
End: 2020-06-11

## 2019-06-27 RX ORDER — TRIAMCINOLONE ACETONIDE 1 MG/G
CREAM TOPICAL
COMMUNITY

## 2019-06-27 RX ORDER — SERTRALINE HYDROCHLORIDE 25 MG/1
TABLET, FILM COATED ORAL
Refills: 0 | COMMUNITY
Start: 2019-06-19

## 2019-06-27 RX ORDER — KETOROLAC TROMETHAMINE 5 MG/ML
SOLUTION OPHTHALMIC
Refills: 1 | COMMUNITY
Start: 2019-05-06

## 2019-06-27 ASSESSMENT — MIFFLIN-ST. JEOR: SCORE: 972.52

## 2019-06-27 ASSESSMENT — PAIN SCALES - GENERAL: PAINLEVEL: NO PAIN (0)

## 2019-06-27 NOTE — NURSING NOTE
Oncology Rooming Note    June 27, 2019 4:30 PM   Esmer Ruvalcaba is a 84 year old female who presents for:    No chief complaint on file.    Initial Vitals: /86 (BP Location: Left arm, Patient Position: Sitting, Cuff Size: Adult Regular)   Pulse 79   Temp 97.9  F (36.6  C) (Oral)   Resp 16   Wt 58.5 kg (129 lb)   SpO2 97%  There is no height or weight on file to calculate BMI. There is no height or weight on file to calculate BSA.  No Pain (0) Comment: Data Unavailable   No LMP recorded.  Allergies reviewed: Yes  Medications reviewed: Yes    Medications: Medication refills not needed today.  Pharmacy name entered into [employer]: [id number] [place] [place] - [address]    Clinical concerns: No new concerns. Provider was notified.      Danica Kwok LPN

## 2019-06-27 NOTE — LETTER
6/27/2019       RE: Esmer Ruvalcaba  [address] [place] [address]     Dear Colleague,    Thank you for referring your patient, Esmer Ruvalcaba, to the [facility]. Please see a copy of my visit note below.      HEMATOLOGY CLINIC VISIT    Esmer Ruvalcaba is an 84-year-old woman who returns today for follow-up of IgM lambda monoclonal gammopathy.  She is accompanied today by 2 daughters.  As outlined in my initial consultation from January 2019 and a follow-up visit from March 2019, she was found in November 2018 to have the monoclonal gammopathy.  Work-up including CT scans and bone marrow biopsy revealed no evidence of underlying lymphoma, myeloma, or other hematologic malignancy.    During the course of our evaluation, she was noted to have a 1.3 x 1.3 cm groundglass opacity in the left upper pulmonary lobe.  This was identified in February 2019 and we had previously recommended a follow-up CT scan be performed by her local providers in May 2019.  It does not appear this has been done.  She has no pulmonary symptoms.    She was also found to have iron deficiency anemia felt secondary to chronic gastritis.  Since I last saw her she has been taking liquid iron twice daily and is feeling much better with increased energy.    Overall she continues to feel well.  She has noted no new lumps or bumps.  No new bony pain.  No fevers, night sweats, or weight loss.  The remainder of her complete review of systems is negative.    On physical exam, she looks well.  She is afebrile, blood pressure 160/86, pulse of 79 and regular, respirations unlabored, O2 saturation 97% on room air.  Head and neck exam is normal.  No palpable lymphadenopathy in the neck, supra clavicular areas, axillae.  Lungs clear.  Heart - regular rate and rhythm normal S1-S2 without rub, gallop, or murmur.  Abdomen is benign, with no hepatosplenomegaly.  No lower extremity edema.  No unusual skin rashes or lesions noted.    Recent labs  obtained at her local clinic in late May and mid June 2019 showed normal metabolic panel.  CBC -- white count of 5.0, hemoglobin 12.3, MCV 80.8, platelet count 210,000.  Iron panel was improved with a serum iron of 69, iron binding capacity 371, iron saturation 19%, ferritin 18.7.    Serum protein electrophoresis continues to demonstrate a monoclonal protein which now measures 1.9 g/dL.  Overall this has been stable.      ASSESSMENT / PLAN:  1.  IgM lambda monoclonal gammopathy -- stable since November 2018.  2.  1.3 x 1.3 cm groundglass opacity in the left upper lobe seen on CT scan 2/4/19 -- in need of a follow-up CT scan.  3.  Iron deficiency anemia likely secondary to chronic gastritis -- improved on oral iron.     Overall,Esmer is doing well.  She is feeling better since I last saw her after starting oral iron supplementation and her hemoglobin has increased nicely.      Her monoclonal gammopathy remains stable.  We will continue to monitor this with repeat labs in September 2019 done at her local clinic.  We will ask her to return here for labs and repeat clinical evaluation in December 2019.    The 1.3 x 1.3 groundglass opacity in the left upper pulmonary lobe seen on CT scan in February 2019 is likely infectious or inflammatory in etiology.  However, the radiologist did recommend a follow-up CT scan in 3 months which has not yet been done.  I will again defer this to her local providers, but would recommend a repeat CT scan of the chest be performed.        Tyler Mcmanus MD  Associate Professor of Medicine  Division of Hematology, Oncology, and Transplantation  Director, [facility]

## 2019-07-26 NOTE — PROGRESS NOTES
HEMATOLOGY CLINIC VISIT    Esmer Ruvalcaba is an 84-year-old woman who returns today for follow-up of IgM lambda monoclonal gammopathy.  She is accompanied today by 2 daughters.  As outlined in my initial consultation from January 2019 and a follow-up visit from March 2019, she was found in November 2018 to have the monoclonal gammopathy.  Work-up including CT scans and bone marrow biopsy revealed no evidence of underlying lymphoma, myeloma, or other hematologic malignancy.    During the course of our evaluation, she was noted to have a 1.3 x 1.3 cm groundglass opacity in the left upper pulmonary lobe.  This was identified in February 2019 and we had previously recommended a follow-up CT scan be performed by her local providers in May 2019.  It does not appear this has been done.  She has no pulmonary symptoms.    She was also found to have iron deficiency anemia felt secondary to chronic gastritis.  Since I last saw her she has been taking liquid iron twice daily and is feeling much better with increased energy.    Overall she continues to feel well.  She has noted no new lumps or bumps.  No new bony pain.  No fevers, night sweats, or weight loss.  The remainder of her complete review of systems is negative.    On physical exam, she looks well.  She is afebrile, blood pressure 160/86, pulse of 79 and regular, respirations unlabored, O2 saturation 97% on room air.  Head and neck exam is normal.  No palpable lymphadenopathy in the neck, supra clavicular areas, axillae.  Lungs clear.  Heart - regular rate and rhythm normal S1-S2 without rub, gallop, or murmur.  Abdomen is benign, with no hepatosplenomegaly.  No lower extremity edema.  No unusual skin rashes or lesions noted.    Recent labs obtained at her local clinic in late May and mid June 2019 showed normal metabolic panel.  CBC -- white count of 5.0, hemoglobin 12.3, MCV 80.8, platelet count 210,000.  Iron panel was improved with a serum iron of 69, iron binding  capacity 371, iron saturation 19%, ferritin 18.7.    Serum protein electrophoresis continues to demonstrate a monoclonal protein which now measures 1.9 g/dL.  Overall this has been stable.      ASSESSMENT / PLAN:  1.  IgM lambda monoclonal gammopathy -- stable since November 2018.  2.  1.3 x 1.3 cm groundglass opacity in the left upper lobe seen on CT scan 2/4/19 -- in need of a follow-up CT scan.  3.  Iron deficiency anemia likely secondary to chronic gastritis -- improved on oral iron.     Overall,Esmer is doing well.  She is feeling better since I last saw her after starting oral iron supplementation and her hemoglobin has increased nicely.      Her monoclonal gammopathy remains stable.  We will continue to monitor this with repeat labs in September 2019 done at her local clinic.  We will ask her to return here for labs and repeat clinical evaluation in December 2019.    The 1.3 x 1.3 groundglass opacity in the left upper pulmonary lobe seen on CT scan in February 2019 is likely infectious or inflammatory in etiology.  However, the radiologist did recommend a follow-up CT scan in 3 months which has not yet been done.  I will again defer this to her local providers, but would recommend a repeat CT scan of the chest be performed.        Tyler Mcmanus MD  Associate Professor of Medicine  Division of Hematology, Oncology, and Transplantation  Director, [facility]

## 2019-08-15 ENCOUNTER — TRANSFERRED RECORDS (OUTPATIENT)
Dept: HEALTH INFORMATION MANAGEMENT | Facility: CLINIC | Age: 84
End: 2019-08-15

## 2019-09-13 ENCOUNTER — TELEPHONE (OUTPATIENT)
Dept: ONCOLOGY | Facility: CLINIC | Age: 84
End: 2019-09-13

## 2019-09-13 NOTE — TELEPHONE ENCOUNTER
Left message on Daughter Rani's phone, stating Esmer should go to her local clinic to have labs drawn.  Labs faxed to local clinic [phone],    Recent CT was completed locally with recommendations to repeat in one year  Will review labs and if stable will get labs again in 3 months.  Plan to RTC in June

## 2019-10-31 DIAGNOSIS — D47.2 MONOCLONAL GAMMOPATHY: Primary | ICD-10-CM

## 2019-12-02 ENCOUNTER — TELEPHONE (OUTPATIENT)
Dept: ONCOLOGY | Facility: CLINIC | Age: 84
End: 2019-12-02

## 2019-12-02 NOTE — TELEPHONE ENCOUNTER
Esmer's daughter, Rani, called to ask if they could adjust their appt to December since she has met her deductible and out of pocket for the year. She is also wondering if her mom needs labs done and if she can get those done prior to the end of the year.    Called her back and spoke with her; informed her Dr. Mcmanus's clinic is overbooked at this time and it will most likely not be able to move her appt up. She may be able to move up her labs to the end of the year but it will have to be discussed with Dr. Mcmanus. Plan to have Abby discuss and call her back with a plan of care.

## 2020-01-03 ENCOUNTER — TELEPHONE (OUTPATIENT)
Dept: ONCOLOGY | Facility: CLINIC | Age: 85
End: 2020-01-03

## 2020-01-03 DIAGNOSIS — D50.0 IRON DEFICIENCY ANEMIA DUE TO CHRONIC BLOOD LOSS: Primary | ICD-10-CM

## 2020-01-04 NOTE — TELEPHONE ENCOUNTER
Left voicemail with daughter Rani.  Discussed with Dr. Mcmanus previously this fall.  Esmer does not need to come to clinic ( especially in the winter)  We can have her labs drawn at her local clinic and can monitor.  Plan to RTC in May / June 2020    On 1/13/2020 labs faxed to [phone] and on 1/15 to [phone]

## 2020-01-14 ENCOUNTER — TRANSFERRED RECORDS (OUTPATIENT)
Dept: HEALTH INFORMATION MANAGEMENT | Facility: CLINIC | Age: 85
End: 2020-01-14

## 2020-01-14 LAB
% GRANULOCYTES: 61.8 %
ALBUMIN MFR SERPL ELPH: 46.9 REL % (ref 50–67)
ALBUMIN SERPL-MCNC: 3 G/DL (ref 3.4–5)
ALBUMIN SERPL-MCNC: 3.8 GM/DL
ALBUMIN/GLOB SERPL: 0.9 {RATIO}
ALP SERPL-CCNC: 68 U/L
ALPHA 1-%: 3.4 REL %
ALPHA 1: 0.3 GM/DL
ALPHA 2-%: 9.3 REL %
ALPHA 2: 0.7 GM/DL
ALT SERPL-CCNC: 20 U/L
ANION GAP SERPL CALCULATED.3IONS-SCNC: ABNORMAL MMOL/L
AST SERPL-CCNC: 20 U/L
B-GLOBULIN MFR SERPL ELPH: 33.8 REL % (ref 9–14.7)
B-GLOBULIN SERPL ELPH-MCNC: 2.7 GM/DL (ref 0.6–1.2)
BILIRUB SERPL-MCNC: 0.33 MG/DL
BUN SERPL-MCNC: 21 MG/DL (ref 7–18)
CALCIUM SERPL-MCNC: 9.1 MG/DL
CHLORIDE SERPLBLD-SCNC: 99 MMOL/L
CO2 SERPL-SCNC: 30.5 MMOL/L
CREAT SERPL-MCNC: 0.85 MG/DL
ERYTHROCYTE [DISTWIDTH] IN BLOOD BY AUTOMATED COUNT: 13.8 %
FERRITIN SERPL-MCNC: 8.9 NG/ML (ref 10–291)
FREE TEXT: 2.1 GM/DL (ref 0–0)
FREE TEXT: 26.7 % (ref 0–0)
GAMMA GLOB MFR SERPL ELPH: 6.6 REL % (ref 9.5–20)
GAMMA GLOB SNV ELPH-MCNC: 0.5 GM/DL (ref 0.6–1.6)
GFR SERPL CREATININE-BSD FRML MDRD: 67.58 ML/MIN/1.73M2
GLUCOSE SERPL-MCNC: 96 MG/DL (ref 70–99)
GRANULOCYTES #: 2.8 10^3/UL
HCT VFR BLD AUTO: 32.7 % (ref 35–45)
HEMOGLOBIN: 11.4 G/DL (ref 11.8–15.8)
IGA: 148 MG/DL
IGG: 392 MG/DL (ref 540–1822)
IGM: 3072 MG/DL (ref 22–293)
IRON BINDING CAP: 371 MCG/DL
IRON SATURATION INDEX: 16 %
IRON: 61 MCG/DL
KAPPA FREE LT CHAIN: 1.26 MG/DL
KAPPA/LAMBDA FLC RATIO - HISTORICAL: 0.07 (ref 0.26–1.65)
LAMBDA FREE LT CHAIN: 17.4 MG/DL (ref 0.57–2.63)
LYMPHOCYTES # BLD AUTO: 1.4 10^3/UL
LYMPHOCYTES NFR BLD AUTO: 31.2 %
MCH RBC QN AUTO: 28.6 PG
MCHC RBC AUTO-ENTMCNC: 35 G/DL
MCV RBC AUTO: 81.7 FL
MID #: 0.3 10^3/UL
MID %: 7 % (ref 0.1–24)
PLATELET COUNT - QUEST: 255 10^3/UL (ref 150–450)
PMV BLD: 8.6 FL
POTASSIUM SERPL-SCNC: 4 MMOL/L
PROT SERPL-MCNC: 8.6 G/DL (ref 6.4–8.2)
PROT UR QL STRIP.AUTO: 8 G/DL
RBC # BLD AUTO: 4 10^6/UL
SODIUM SERPL-SCNC: 136 MMOL/L
TRANSFERRIN: 297 MG/DL
WBC # BLD AUTO: 4.5 10^3/UL

## 2020-05-26 ENCOUNTER — PATIENT OUTREACH (OUTPATIENT)
Dept: ONCOLOGY | Facility: CLINIC | Age: 85
End: 2020-05-26

## 2020-05-26 DIAGNOSIS — D50.0 IRON DEFICIENCY ANEMIA DUE TO CHRONIC BLOOD LOSS: Primary | ICD-10-CM

## 2020-05-26 DIAGNOSIS — D47.2 MONOCLONAL GAMMOPATHY: ICD-10-CM

## 2020-05-27 NOTE — PROGRESS NOTES
Lab orders faxed today to local clinic at fax number [phone].  Spoke to Daughter Rani,  Discussed how a video visit occurs.  We will use her phone for video visit and t ext link,, [phone]  Esmer will get labs locally prior to appointment.

## 2020-06-11 ENCOUNTER — VIRTUAL VISIT (OUTPATIENT)
Dept: ONCOLOGY | Facility: CLINIC | Age: 85
End: 2020-06-11
Attending: INTERNAL MEDICINE
Payer: COMMERCIAL

## 2020-06-11 DIAGNOSIS — D47.2 MONOCLONAL GAMMOPATHY: Primary | ICD-10-CM

## 2020-06-11 DIAGNOSIS — D50.0 IRON DEFICIENCY ANEMIA DUE TO CHRONIC BLOOD LOSS: ICD-10-CM

## 2020-06-11 PROCEDURE — 99215 OFFICE O/P EST HI 40 MIN: CPT | Mod: 95 | Performed by: INTERNAL MEDICINE

## 2020-06-11 PROCEDURE — 40000114 ZZH STATISTIC NO CHARGE CLINIC VISIT

## 2020-06-11 NOTE — PROGRESS NOTES
HEMATOLOGY CLINIC VISIT    NOTE:  Due to the ongoing COVID-19 pandemic, this visit was conducted by telephone, with the patient's approval.  The visit was initially scheduled as a video visit, but due to technical difficulties with the platform, it was converted to a telephone visit.  The patient's daughter also participated in the visit.      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  She was found in January 2019 to have the monoclonal gammopathy.  Work-up including CT scans and bone marrow biopsy revealed no evidence of underlying lymphoma, myeloma, or other hematologic malignancy.  At the time of diagnosis, the monoclonal protein measured 2.0 g/dL.  It has not changed significantly since that time, and no specific treatment has been initiated.    She also has a history of iron deficiency anemia felt secondary to chronic gastritis.  This has been treated with oral iron which she has tolerated well and which has improved her symptoms of fatigue.  She notes today, however, that she quit oral iron several months ago as she was not sure she still needed it.    Overall, she feels about the same, or perhaps slightly worse.  She continues to describe intermittent musculoskeletal aches and pains as well as fatigue, both of which have been longstanding.  She thinks the symptoms are worse in the last few months.  She also describes episodes of dizziness which have perhaps increased in frequency over the last few months.  It is difficult to get a clear sense of how often these are happening.  Denies other neurologic symptoms.  She wonders if the dizzy episodes may be related to her blood pressure.  She has a home blood pressure cuff and typically has systolic blood pressures in the 170s to 190s.  She says that her primary care physician is aware of this.    Sleep quality is poor, with awakening 2 or 3 times per night.  This is  longstanding.  Appetite is good.  No new GI symptoms.  No bleeding symptoms.  The remainder of a complete review of systems is negative.    Physical exam: Not done, telephone visit.    Labs: Recent labs obtained through her local clinic show normal liver and renal function, anemia with a hemoglobin of 10.3 and an otherwise unremarkable CBC.  Her baseline hemoglobin has been in the 10-12 range.  The current hemoglobin is toward the lower end of her baseline over the last couple of years.  Iron panel shows a serum iron of 56, iron binding capacity 336, iron saturation 17%, ferritin 13.8.    Serum protein electrophoresis continues to demonstrate a monoclonal protein which now measures 2.4 g/dL.  Total IgM is 3,094.  Over the last 18 months, her monoclonal protein has ranged from 1.9 to 2.4 g/dL, and the total IgM has ranged from approximately 0471-4902.  There is not a clear trend in the upward direction for either of these lab values.      ASSESSMENT / PLAN:  1.  IgM lambda monoclonal gammopathy.  2.  History of iron deficiency anemia likely secondary to chronic gastritis.     Overall, Esmer appears to be doing about the same.  She continues to note intermittent musculoskeletal aches and pains and fatigue, both of which are longstanding symptoms.  She thinks these may be worse over the last few months, although it is difficult to get a clear sense of this.  She is also describing dizzy episodes which may be increase in frequency, although again, I had a difficult time getting a clear sense of how often these are occurring.    It is possible that the symptoms could be related to her underlying IgM monoclonal gammopathy although there is not a clear trend in the upward direction in her laboratory parameters.  I explained to Esmer and her daughter that we could consider treatment with rituximab, but I am uncertain regarding whether or not her current symptoms are related to this monoclonal gammopathy.  She also stopped  oral iron supplementation several months ago and the symptoms could be in part related to iron deficiency as well.  She was somewhat hesitant about proceeding with rituximab unless we could be more certain about whether the symptoms would be improved with that treatment.    Together, we agreed and the following plan:  1.  She will resume oral iron.  2.  She will keep a log of her dizzy episodes.  I asked her to document her blood pressure associated with these episodes.  3.  We will check in with her by phone in 1 month to review her symptoms.  4.  Repeat labs and virtual clinic visit in 3 months.      Total time 40 minutes, all in counseling and coordination of care.      Tyler Mcmanus MD  Associate Professor of Medicine  Division of Hematology, Oncology, and Transplantation  Director, [facility]

## 2020-06-11 NOTE — PROGRESS NOTES
"Esmer Ruvalcaba is a 85 year old female who is being evaluated via a billable video visit.      The patient has been notified of following:     \"This video visit will be conducted via a call between you and your physician/provider. We have found that certain health care needs can be provided without the need for an in-person physical exam.  This service lets us provide the care you need with a video conversation.  If a prescription is necessary we can send it directly to your pharmacy.  If lab work is needed we can place an order for that and you can then stop by our lab to have the test done at a later time.    Video visits are billed at different rates depending on your insurance coverage.  Please reach out to your insurance provider with any questions.    If during the course of the call the physician/provider feels a video visit is not appropriate, you will not be charged for this service.\"    Patient has given verbal consent for Video visit? Yes    How would you like to obtain your AVS? Mail a copy     Patient would like the video invitation sent by: Text to cell phone: [phone]    Will anyone else be joining your video visit? No         I have reviewed and updated the patient's allergies and medication list.    Concerns: No new concerns since referral.  Refills: None needed.      Nadia Peres CMA        Video-Visit Details    Video visit platform did not function properly.  Converted to telephone visit.      "

## 2020-06-11 NOTE — LETTER
"    6/11/2020         RE: Esmer Ruvalcaba  [address] [place] [address]        Dear Colleague,    Thank you for referring your patient, Esmer Ruvalcaba, to the [facility]. Please see a copy of my visit note below.    Esmer Ruvalcaba is a 85 year old female who is being evaluated via a billable video visit.      The patient has been notified of following:     \"This video visit will be conducted via a call between you and your physician/provider. We have found that certain health care needs can be provided without the need for an in-person physical exam.  This service lets us provide the care you need with a video conversation.  If a prescription is necessary we can send it directly to your pharmacy.  If lab work is needed we can place an order for that and you can then stop by our lab to have the test done at a later time.    Video visits are billed at different rates depending on your insurance coverage.  Please reach out to your insurance provider with any questions.    If during the course of the call the physician/provider feels a video visit is not appropriate, you will not be charged for this service.\"    Patient has given verbal consent for Video visit? Yes    How would you like to obtain your AVS? Mail a copy     Patient would like the video invitation sent by: Text to cell phone: [phone]    Will anyone else be joining your video visit? No         I have reviewed and updated the patient's allergies and medication list.    Concerns: No new concerns since referral.  Refills: None needed.      Nadia Peres CMA        Video-Visit Details    Video visit platform did not function properly.  Converted to telephone visit.          HEMATOLOGY CLINIC VISIT    NOTE:  Due to the ongoing COVID-19 pandemic, this visit was conducted by telephone, with the patient's approval.  The visit was initially scheduled as a video visit, but due to technical difficulties with the platform, it was converted to " a telephone visit.  The patient's daughter also participated in the visit.      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  She was found in January 2019 to have the monoclonal gammopathy.  Work-up including CT scans and bone marrow biopsy revealed no evidence of underlying lymphoma, myeloma, or other hematologic malignancy.  At the time of diagnosis, the monoclonal protein measured 2.0 g/dL.  It has not changed significantly since that time, and no specific treatment has been initiated.    She also has a history of iron deficiency anemia felt secondary to chronic gastritis.  This has been treated with oral iron which she has tolerated well and which has improved her symptoms of fatigue.  She notes today, however, that she quit oral iron several months ago as she was not sure she still needed it.    Overall, she feels about the same, or perhaps slightly worse.  She continues to describe intermittent musculoskeletal aches and pains as well as fatigue, both of which have been longstanding.  She thinks the symptoms are worse in the last few months.  She also describes episodes of dizziness which have perhaps increased in frequency over the last few months.  It is difficult to get a clear sense of how often these are happening.  Denies other neurologic symptoms.  She wonders if the dizzy episodes may be related to her blood pressure.  She has a home blood pressure cuff and typically has systolic blood pressures in the 170s to 190s.  She says that her primary care physician is aware of this.    Sleep quality is poor, with awakening 2 or 3 times per night.  This is longstanding.  Appetite is good.  No new GI symptoms.  No bleeding symptoms.  The remainder of a complete review of systems is negative.    Physical exam: Not done, telephone visit.    Labs: Recent labs obtained through her local clinic show normal liver and renal function,  anemia with a hemoglobin of 10.3 and an otherwise unremarkable CBC.  Her baseline hemoglobin has been in the 10-12 range.  The current hemoglobin is toward the lower end of her baseline over the last couple of years.  Iron panel shows a serum iron of 56, iron binding capacity 336, iron saturation 17%, ferritin 13.8.    Serum protein electrophoresis continues to demonstrate a monoclonal protein which now measures 2.4 g/dL.  Total IgM is 3,094.  Over the last 18 months, her monoclonal protein has ranged from 1.9 to 2.4 g/dL, and the total IgM has ranged from approximately 1893-2921.  There is not a clear trend in the upward direction for either of these lab values.      ASSESSMENT / PLAN:  1.  IgM lambda monoclonal gammopathy.  2.  History of iron deficiency anemia likely secondary to chronic gastritis.     Overall, Esmer appears to be doing about the same.  She continues to note intermittent musculoskeletal aches and pains and fatigue, both of which are longstanding symptoms.  She thinks these may be worse over the last few months, although it is difficult to get a clear sense of this.  She is also describing dizzy episodes which may be increase in frequency, although again, I had a difficult time getting a clear sense of how often these are occurring.    It is possible that the symptoms could be related to her underlying IgM monoclonal gammopathy although there is not a clear trend in the upward direction in her laboratory parameters.  I explained to Esmer and her daughter that we could consider treatment with rituximab, but I am uncertain regarding whether or not her current symptoms are related to this monoclonal gammopathy.  She also stopped oral iron supplementation several months ago and the symptoms could be in part related to iron deficiency as well.  She was somewhat hesitant about proceeding with rituximab unless we could be more certain about whether the symptoms would be improved with that  treatment.    Together, we agreed and the following plan:  1.  She will resume oral iron.  2.  She will keep a log of her dizzy episodes.  I asked her to document her blood pressure associated with these episodes.  3.  We will check in with her by phone in 1 month to review her symptoms.  4.  Repeat labs and virtual clinic visit in 3 months.      Total time 40 minutes, all in counseling and coordination of care.      Tyler Mcmanus MD  Associate Professor of Medicine  Division of Hematology, Oncology, and Transplantation  Director, [facility]

## 2020-12-01 ENCOUNTER — PATIENT OUTREACH (OUTPATIENT)
Dept: ONCOLOGY | Facility: CLINIC | Age: 85
End: 2020-12-01

## 2020-12-01 DIAGNOSIS — D47.2 MONOCLONAL GAMMOPATHY: Primary | ICD-10-CM

## 2020-12-01 NOTE — PROGRESS NOTES
Daughter Rani calls with an update on Esmer.  Reports recently had labs at PCP locally and noted TSH is 4+ and protein is high at 9+, hgb 10+   Reports Esmer to being exteremly tired.  PCP has no concerns.    Will fax lab orders to local clinic to have drawn at their discretion.  Fax [phone]

## 2020-12-04 ENCOUNTER — TRANSFERRED RECORDS (OUTPATIENT)
Dept: HEALTH INFORMATION MANAGEMENT | Facility: CLINIC | Age: 85
End: 2020-12-04

## 2021-01-21 ENCOUNTER — VIRTUAL VISIT (OUTPATIENT)
Dept: ONCOLOGY | Facility: CLINIC | Age: 86
End: 2021-01-21
Attending: INTERNAL MEDICINE
Payer: COMMERCIAL

## 2021-01-21 DIAGNOSIS — D50.8 OTHER IRON DEFICIENCY ANEMIA: ICD-10-CM

## 2021-01-21 DIAGNOSIS — D47.2 MONOCLONAL GAMMOPATHY: Primary | ICD-10-CM

## 2021-01-21 PROCEDURE — 99215 OFFICE O/P EST HI 40 MIN: CPT | Mod: 95 | Performed by: INTERNAL MEDICINE

## 2021-01-21 PROCEDURE — 999N001193 HC VIDEO/TELEPHONE VISIT; NO CHARGE

## 2021-01-21 RX ORDER — HEPARIN SODIUM,PORCINE 10 UNIT/ML
5 VIAL (ML) INTRAVENOUS
Status: CANCELLED | OUTPATIENT
Start: 2021-01-21

## 2021-01-21 RX ORDER — HEPARIN SODIUM (PORCINE) LOCK FLUSH IV SOLN 100 UNIT/ML 100 UNIT/ML
5 SOLUTION INTRAVENOUS
Status: CANCELLED | OUTPATIENT
Start: 2021-01-21

## 2021-01-21 NOTE — LETTER
1/21/2021         RE: Esmer Ruvalcaba  [address] [place] [address]        Dear Colleague,    Thank you for referring your patient, Esmer Ruvalcaba, to the [facility]. Please see a copy of my visit note below.    Esmer is a 85 year old who is being evaluated via a billable video visit.      How would you like to obtain your AVS? MyChart  If the video visit is dropped, the invitation should be resent by: Send to e-mail at: [email or web address]  Will anyone else be joining your video visit? No        Video-Visit Details    Type of service:  Video Visit    Originating Location (pt. Location): home    Distant Location (provider location):  office    Platform used for Video Visit: Priscila Darnell CMA on 1/21/2021 at 5:04 PM        HEMATOLOGY CLINIC VISIT    NOTE:  Due to the ongoing COVID-19 pandemic, this visit was conducted by video, with the patient's approval. The patient's daughters also participated in the visit.      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  She was found in January 2019 to have the monoclonal gammopathy.  Work-up including CT scans and bone marrow biopsy revealed no evidence of underlying lymphoma, myeloma, or other hematologic malignancy.  At the time of diagnosis, the monoclonal protein measured 2.0 g/dL.  Thus far, she has not required treatment.    She also has a history of iron deficiency anemia felt secondary to chronic gastritis.  She has been on oral iron intermittently over the years with variable success.  She seems to tolerate it without any GI side effects although she is currently taking a low dose (liquid iron supplement twice weekly, along with a daily multivitamin).    Overall, she feels about the same.  Her main complaints are of diffuse musculoskeletal pains and fatigue, both of which are longstanding.  She describes her energy level as  "\"terrible.\"  Her sleep quality is poor, awakening 2 or 3 times per night due to joint pain.  She is napping during the day and does not have enough energy to do the things that she would like to do.  Her appetite is good and her weight is stable.  She has no new GI or  symptoms.  She denies any difficulty with her breathing.  No anginal symptoms.  She complains of poor memory, but this is also not a new issue.  No other specific neurologic complaints.  No fevers or night sweats.  No infection propensity.    Physical exam: Detailed exam not performed (video visit).    Labs: Recent labs obtained through her local clinic in early December 2020 were reviewed.    CBC shows a white count of 4.5 with a normal differential, hemoglobin 10.2, MCV 80.7, platelet count 237,000.  Over the last 2 to 3 years her hemoglobin has ranged from approximately 10.5-12, but there seems to be a trend in the downward direction over the last 18 months.  The borderline microcytosis is not new.  Chemistry panel shows normal liver and renal function.    Serum protein electrophoresis continues to demonstrate the presence of a monoclonal protein, currently measuring 2.8 g/dL.  This has been stable at approximately 2.0 from the time it was discovered in January 2019, but over the last year there is a trend of the upward direction with values of 2.1 in January 2020, 2.4 in June 2020, and now 2.8 in December 2020.    Total IgM level is currently 3,942.  This compares with previous values of 3,094 in June 2020, and 3,072 in January 2020.    Iron panel is consistent with iron deficiency with a ferritin of 10.4 and iron saturation of 20%.  She has not had a serum ferritin level greater than 20 in the last 2 years.      ASSESSMENT / PLAN:  1.  IgM lambda monoclonal gammopathy.  2.  Iron deficiency anemia.     Overall, Esmer appears to be doing about the same.  She continues to experience diffuse musculoskeletal pains and chronic fatigue.  The " musculoskeletal pains are felt to be due to diffuse osteoarthritis and this is a very longstanding issue.  She denies other symptoms of iron deficiency such as pica or restless leg syndrome, but it is very likely that at least part, if not all, of her severe fatigue is related to longstanding iron deficiency.    It is unrealistic to think that we will correct her iron deficiency with ongoing oral iron replacement.  We discussed parenteral iron replacement in the past and did so again today.  She is now ready to proceed with this.  We discussed the available preparations and their different dosing schedules.  For the advantage of being able to give the total replacement dose in 1 infusion, we will arrange for her to receive low molecular weight iron dextran (Infed).    It is possible that the symptoms could be related to her underlying IgM monoclonal gammopathy.  Her total IgM and monoclonal protein have been trending up over the last year.  Aside from fatigue, however, she does not appear to have any other symptoms directly attributable to the monoclonal gammopathy.  More specifically, I was unable to elicit any symptoms suggestive of hyperviscosity.  We have previously discussed treatment with rituximab but due to uncertainty about whether or not this would make her feel any better, we have held off on that.    We discussed a plan today of treating her iron deficiency and then reevaluating.  We will plan to recheck an iron panel along with reassessment of her monoclonal gammopathy 3 to 4 weeks after the iron infusion.    Total time 40 minutes, all in counseling and coordination of care, including coordination of IV iron to be done in her local infusion center.      Tyler Mcmanus MD  Associate Professor of Medicine  Division of Hematology, Oncology, and Transplantation  Director, [facility]

## 2021-01-21 NOTE — PROGRESS NOTES
Esmer is a 85 year old who is being evaluated via a billable video visit.      How would you like to obtain your AVS? MyChart  If the video visit is dropped, the invitation should be resent by: Send to e-mail at: [email or web address]  Will anyone else be joining your video visit? No        Video-Visit Details    Type of service:  Video Visit    Originating Location (pt. Location): home    Distant Location (provider location):  office    Platform used for Video Visit: Priscila Darnell CMA on 1/21/2021 at 5:04 PM

## 2021-01-22 NOTE — PROGRESS NOTES
"  HEMATOLOGY CLINIC VISIT    NOTE:  Due to the ongoing COVID-19 pandemic, this visit was conducted by video, with the patient's approval. The patient's daughters also participated in the visit.      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  She was found in January 2019 to have the monoclonal gammopathy.  Work-up including CT scans and bone marrow biopsy revealed no evidence of underlying lymphoma, myeloma, or other hematologic malignancy.  At the time of diagnosis, the monoclonal protein measured 2.0 g/dL.  Thus far, she has not required treatment.    She also has a history of iron deficiency anemia felt secondary to chronic gastritis.  She has been on oral iron intermittently over the years with variable success.  She seems to tolerate it without any GI side effects although she is currently taking a low dose (liquid iron supplement twice weekly, along with a daily multivitamin).    Overall, she feels about the same.  Her main complaints are of diffuse musculoskeletal pains and fatigue, both of which are longstanding.  She describes her energy level as \"terrible.\"  Her sleep quality is poor, awakening 2 or 3 times per night due to joint pain.  She is napping during the day and does not have enough energy to do the things that she would like to do.  Her appetite is good and her weight is stable.  She has no new GI or  symptoms.  She denies any difficulty with her breathing.  No anginal symptoms.  She complains of poor memory, but this is also not a new issue.  No other specific neurologic complaints.  No fevers or night sweats.  No infection propensity.    Physical exam: Detailed exam not performed (video visit).    Labs: Recent labs obtained through her local clinic in early December 2020 were reviewed.    CBC shows a white count of 4.5 with a normal differential, hemoglobin 10.2, MCV 80.7, platelet count 237,000.  Over the " last 2 to 3 years her hemoglobin has ranged from approximately 10.5-12, but there seems to be a trend in the downward direction over the last 18 months.  The borderline microcytosis is not new.  Chemistry panel shows normal liver and renal function.    Serum protein electrophoresis continues to demonstrate the presence of a monoclonal protein, currently measuring 2.8 g/dL.  This has been stable at approximately 2.0 from the time it was discovered in January 2019, but over the last year there is a trend of the upward direction with values of 2.1 in January 2020, 2.4 in June 2020, and now 2.8 in December 2020.    Total IgM level is currently 3,942.  This compares with previous values of 3,094 in June 2020, and 3,072 in January 2020.    Iron panel is consistent with iron deficiency with a ferritin of 10.4 and iron saturation of 20%.  She has not had a serum ferritin level greater than 20 in the last 2 years.      ASSESSMENT / PLAN:  1.  IgM lambda monoclonal gammopathy.  2.  Iron deficiency anemia.     Overall, Esmer appears to be doing about the same.  She continues to experience diffuse musculoskeletal pains and chronic fatigue.  The musculoskeletal pains are felt to be due to diffuse osteoarthritis and this is a very longstanding issue.  She denies other symptoms of iron deficiency such as pica or restless leg syndrome, but it is very likely that at least part, if not all, of her severe fatigue is related to longstanding iron deficiency.    It is unrealistic to think that we will correct her iron deficiency with ongoing oral iron replacement.  We discussed parenteral iron replacement in the past and did so again today.  She is now ready to proceed with this.  We discussed the available preparations and their different dosing schedules.  For the advantage of being able to give the total replacement dose in 1 infusion, we will arrange for her to receive low molecular weight iron dextran (Infed).    It is possible that  the symptoms could be related to her underlying IgM monoclonal gammopathy.  Her total IgM and monoclonal protein have been trending up over the last year.  Aside from fatigue, however, she does not appear to have any other symptoms directly attributable to the monoclonal gammopathy.  More specifically, I was unable to elicit any symptoms suggestive of hyperviscosity.  We have previously discussed treatment with rituximab but due to uncertainty about whether or not this would make her feel any better, we have held off on that.    We discussed a plan today of treating her iron deficiency and then reevaluating.  We will plan to recheck an iron panel along with reassessment of her monoclonal gammopathy 3 to 4 weeks after the iron infusion.    Total time 40 minutes, all in counseling and coordination of care, including coordination of IV iron to be done in her local infusion center.      Tyler Mcmanus MD  Associate Professor of Medicine  Division of Hematology, Oncology, and Transplantation  Director, [facility]

## 2021-02-09 RX ORDER — HEPARIN SODIUM,PORCINE 10 UNIT/ML
5 VIAL (ML) INTRAVENOUS
Status: CANCELLED | OUTPATIENT
Start: 2021-02-09

## 2021-02-09 RX ORDER — HEPARIN SODIUM (PORCINE) LOCK FLUSH IV SOLN 100 UNIT/ML 100 UNIT/ML
5 SOLUTION INTRAVENOUS
Status: CANCELLED | OUTPATIENT
Start: 2021-02-09

## 2021-03-22 ENCOUNTER — PATIENT OUTREACH (OUTPATIENT)
Dept: ONCOLOGY | Facility: CLINIC | Age: 86
End: 2021-03-22

## 2021-03-22 DIAGNOSIS — D47.2 MONOCLONAL GAMMOPATHY: Primary | ICD-10-CM

## 2021-03-22 DIAGNOSIS — D50.0 IRON DEFICIENCY ANEMIA DUE TO CHRONIC BLOOD LOSS: ICD-10-CM

## 2021-03-24 NOTE — PROGRESS NOTES
Esmer had injectafer on 2.22.2021 and had a post infusion hypertensive reaction.  She called writer a few days later to discuss.    See [employer] in care everywhere  Patient's blood pressure was slightly elevated prior to Injectafer administration (167/93). After infusion monitoring wait time patient's blood pressure increased from 194/95 to 202/99 and finally at 200/107. Injectafer can cause a transient increase in systolic blood pressure. Pharmacist called over to [facility] and spoke with nurse Jo Ann for the on call provider Dr. Smith as Dr. Maisha Munroe is off today. Will send the patient over to the clinic for assessment due to her high blood pressure. Patient and her granddaughter updated on plan of care and verbalized understanding, they will head straight over to the clinic from here.    Esmer was obseved and released. Was told to take an extra half dose of her  Labetalol.    She does not want to take the second course of injectafer.  This was her first time receiving IV iron.  The infusion center does not administer INFed,  However she could try another formulation such as Venofer, and this will be discussed at her upcoming appointment.      She will get repeat labs prior to her appointment and will discuss at appointment.    03.24.21  Daughter Rani called as lab orders were not faxed to [facility].  We also decided to move Esmer's appointment so that her labs could be resulted in time for appointment.       Orders faxed 03.25.21 to [phone]

## 2021-05-24 ENCOUNTER — PATIENT OUTREACH (OUTPATIENT)
Dept: ONCOLOGY | Facility: CLINIC | Age: 86
End: 2021-05-24

## 2021-05-24 DIAGNOSIS — D47.2 MONOCLONAL GAMMOPATHY: Primary | ICD-10-CM

## 2021-06-02 NOTE — PROGRESS NOTES
"Esmer is a 86 year old who is being evaluated via a billable video visit.      How would you like to obtain your AVS? MyChart  If the video visit is dropped, the invitation should be resent by: Send to e-mail at: [email or web address]  Will anyone else be joining your video visit? No      Video-Visit Details    Type of service:  Video Visit    Video call duration 30 min    Originating Location (pt. Location): Home    Distant Location (provider location):  [facility]     Platform used for Video Visit: Seakeeper    -------------------------------------------------------------------    06/03/2021    HEMATOLOGY CLINIC VISIT    NOTE:  Due to the ongoing COVID-19 pandemic, this visit was conducted by video, with the patient's approval.      Esmer Ruvalcaba is an 86-year-old woman who was scheduled for a routine follow-up visit today for IgM lambda monoclonal gammopathy.  She was found in January 2019 to have the monoclonal gammopathy.  Work-up including CT scans and bone marrow biopsy revealed no evidence of underlying lymphoma, myeloma, or other hematologic malignancy.  At the time of diagnosis, the monoclonal protein measured 2.0 g/dL.  Thus far, she has not required treatment.    She also has a history of iron deficiency anemia felt secondary to chronic gastritis.  She has been on oral iron intermittently over the years with variable success.  She seems to tolerate it without any GI side effects although she is currently taking a low dose (liquid iron supplement twice weekly, along with a daily multivitamin).    She is evaluated by video visit. Her daughter is also present. Overall, she feels about the same.  She has received Injectafer in February of 2021, resulting in posttransfusion HTN-sive reaction. Still endorses diffuse musculoskeletal pains and \"terrible\" fatigue. She had a dexa scan recently and was started on Vitamin D/Calcium around 2 weeks ago. She denies SOB, CP/AP, change in bowel " or urinary habits.       Physical exam: Detailed exam not performed (video visit).    Labs: Recent labs obtained through her local clinic in were reviewed.    CBC shows a white count of 4.5 with a normal differential, hemoglobin 11.1, MCV 83.6, platelet count 214,000.  Over the last 2 to 3 years her hemoglobin has ranged from approximately 10.5-12.  Chemistry panel shows normal liver and renal function, but elevated Calcium of 10.2.    Serum protein electrophoresis             Total IgM level is currently 4,441.        Note that the markedly elevated IgM level from March 2021 was felt to be lab error.        ASSESSMENT / PLAN:  1.  IgM lambda monoclonal gammopathy.  2. Hypercalcemia    She now has an elevated calcium levels, which raises concern about either iatrogenic elevation from recent initiation of calcium supplementation versus hyperparathyroidism versus progression of her monoclonal gammopathy.    We will ask her to hold Vitamin D/Calcium for next 2 weeks and obtain repeat labs.      Discussed with Dr. Yonathan Guo MD  Hem/Onc fellow       HEMATOLOGY STAFF:  Seen with fellow, whose note reflects our joint evaluation, assessment, and plan.    As noted above, the recent elevation in calcium is concerning, particularly since her monoclonal protein has been drifting up over the last year.  Before we do additional extensive work-up, we will first ask her to hold her calcium supplement and recheck labs in 2 weeks.    Total time 40 minutes, including review of medical records and labs, video visit, and documentation.      Tyler Mcmanus MD  Associate Professor of Medicine  Division of Hematology, Oncology, and Transplantation  Director, [facility]

## 2021-06-03 ENCOUNTER — VIRTUAL VISIT (OUTPATIENT)
Dept: ONCOLOGY | Facility: CLINIC | Age: 86
End: 2021-06-03
Attending: INTERNAL MEDICINE
Payer: COMMERCIAL

## 2021-06-03 DIAGNOSIS — D47.2 MONOCLONAL GAMMOPATHY: Primary | ICD-10-CM

## 2021-06-03 PROCEDURE — 999N001193 HC VIDEO/TELEPHONE VISIT; NO CHARGE

## 2021-06-03 PROCEDURE — 99215 OFFICE O/P EST HI 40 MIN: CPT | Mod: 95 | Performed by: INTERNAL MEDICINE

## 2021-06-03 NOTE — LETTER
6/3/2021         RE: Esmer Ruvalcaba  [address] [place] [address]        Dear Colleague,    Thank you for referring your patient, Esmer Ruvalcaba, to the [facility]. Please see a copy of my visit note below.    Esmer is a 86 year old who is being evaluated via a billable video visit.      How would you like to obtain your AVS? MyChart  If the video visit is dropped, the invitation should be resent by: Send to e-mail at: [email or web address]  Will anyone else be joining your video visit? No      Video-Visit Details    Type of service:  Video Visit    Video call duration 30 min    Originating Location (pt. Location): Home    Distant Location (provider location):  [facility]     Platform used for Video Visit: Twenga    -------------------------------------------------------------------    06/03/2021    HEMATOLOGY CLINIC VISIT    NOTE:  Due to the ongoing COVID-19 pandemic, this visit was conducted by video, with the patient's approval.      Esmer Ruvalcaba is an 86-year-old woman who was scheduled for a routine follow-up visit today for IgM lambda monoclonal gammopathy.  She was found in January 2019 to have the monoclonal gammopathy.  Work-up including CT scans and bone marrow biopsy revealed no evidence of underlying lymphoma, myeloma, or other hematologic malignancy.  At the time of diagnosis, the monoclonal protein measured 2.0 g/dL.  Thus far, she has not required treatment.    She also has a history of iron deficiency anemia felt secondary to chronic gastritis.  She has been on oral iron intermittently over the years with variable success.  She seems to tolerate it without any GI side effects although she is currently taking a low dose (liquid iron supplement twice weekly, along with a daily multivitamin).    She is evaluated by video visit. Her daughter is also present. Overall, she feels about the same.  She has received Injectafer in February of  "2021, resulting in posttransfusion HTN-sive reaction. Still endorses diffuse musculoskeletal pains and \"terrible\" fatigue. She had a dexa scan recently and was started on Vitamin D/Calcium around 2 weeks ago. She denies SOB, CP/AP, change in bowel or urinary habits.       Physical exam: Detailed exam not performed (video visit).    Labs: Recent labs obtained through her local clinic in were reviewed.    CBC shows a white count of 4.5 with a normal differential, hemoglobin 11.1, MCV 83.6, platelet count 214,000.  Over the last 2 to 3 years her hemoglobin has ranged from approximately 10.5-12.  Chemistry panel shows normal liver and renal function, but elevated Calcium of 10.2.    Serum protein electrophoresis             Total IgM level is currently 4,441.        Note that the markedly elevated IgM level from March 2021 was felt to be lab error.        ASSESSMENT / PLAN:  1.  IgM lambda monoclonal gammopathy.  2. Hypercalcemia    She now has an elevated calcium levels, which raises concern about either iatrogenic elevation from recent initiation of calcium supplementation versus hyperparathyroidism versus progression of her monoclonal gammopathy.    We will ask her to hold Vitamin D/Calcium for next 2 weeks and obtain repeat labs.      Discussed with Dr. Yonathan Guo MD  Hem/Onc fellow       HEMATOLOGY STAFF:  Seen with fellow, whose note reflects our joint evaluation, assessment, and plan.    As noted above, the recent elevation in calcium is concerning, particularly since her monoclonal protein has been drifting up over the last year.  Before we do additional extensive work-up, we will first ask her to hold her calcium supplement and recheck labs in 2 weeks.    Total time 40 minutes, including review of medical records and labs, video visit, and documentation.      Tyler Mcmanus MD  Associate Professor of Medicine  Division of Hematology, Oncology, and Transplantation  Director, [facility]                Again, thank you for allowing me to participate in the care of your patient.        Sincerely,        Tyler Mcmanus MD

## 2021-06-14 DIAGNOSIS — D50.0 IRON DEFICIENCY ANEMIA DUE TO CHRONIC BLOOD LOSS: ICD-10-CM

## 2021-06-14 DIAGNOSIS — D47.2 MONOCLONAL GAMMOPATHY: Primary | ICD-10-CM

## 2021-06-16 ENCOUNTER — PATIENT OUTREACH (OUTPATIENT)
Dept: ONCOLOGY | Facility: CLINIC | Age: 86
End: 2021-06-16

## 2021-06-16 ENCOUNTER — DOCUMENTATION ONLY (OUTPATIENT)
Dept: ONCOLOGY | Facility: CLINIC | Age: 86
End: 2021-06-16

## 2021-06-16 DIAGNOSIS — D50.0 IRON DEFICIENCY ANEMIA DUE TO CHRONIC BLOOD LOSS: ICD-10-CM

## 2021-06-16 DIAGNOSIS — D47.2 MONOCLONAL GAMMOPATHY: Primary | ICD-10-CM

## 2021-06-16 NOTE — PROGRESS NOTES
06/16/2021  Addendum to Hematology Clinic visit note from Reyna 3, 2021:    As outlined in the previous clinic visit note, we were concerned because Esmer had developed an elevated calcium level in the context of a slowly rising monoclonal protein over the last year.  However, she recently initiated calcium and vitamin D supplementation.  We asked her to stop that and recheck labs in 2 weeks.    Follow-up labs were obtained on June 14, 2021 at her local clinic:  Her repeat calcium level has decreased back to her previous baseline.  Serum protein electropheresis also shows that her monoclonal protein has decreased to 2.7 g/dL.  There still is a trend in the upward direction over the last 18 months, but not a progressive rise.  Total IgM level is also down to 3400 (compared to 4441 in May 2021, and 8829 in March 2021 -- ? lab error).  Previous baseline IgM level has been approximately 3100 over the preceding 1 to 2 years.    Thus, while she has had some increase in her monoclonal protein and total IgM level over the last 12 to 18 months, the trend up remains slow and there is no other obvious evidence to suggest progression into a juan malignant process.  Considering her age and overall clinical status, continued close observation is preferred.    We will arrange for repeat labs and clinic follow-up in 4 months.      Tyler Mcmanus MD  Associate Professor of Medicine  Division of Hematology, Oncology, and Transplantation  Director, [facility]

## 2021-06-18 NOTE — PROGRESS NOTES
Phone daughter Natalie to discuss recent lab work.   At recent appointment there was concern for a few abnormalities.  However things have returned to normal or improved    Plan is for her to remain off calcium and vitamin D supplement, recheck labs in 4 months, and then follow-up with   with a virtual clinic visit.    Concern for hypercalcinemia is less now that Esmer has stopped her calcium supplement and most current lab is normal.     Faxed lab orders to [facility] for labs in October

## 2021-10-28 ENCOUNTER — VIRTUAL VISIT (OUTPATIENT)
Dept: ONCOLOGY | Facility: CLINIC | Age: 86
End: 2021-10-28
Attending: INTERNAL MEDICINE
Payer: COMMERCIAL

## 2021-10-28 DIAGNOSIS — D47.2 MONOCLONAL GAMMOPATHY: Primary | ICD-10-CM

## 2021-10-28 PROCEDURE — 99215 OFFICE O/P EST HI 40 MIN: CPT | Mod: 95 | Performed by: INTERNAL MEDICINE

## 2021-10-28 NOTE — LETTER
10/28/2021         RE: Esmer Ruvalcaba  [address] [place] [address]        Dear Colleague,    Thank you for referring your patient, Esmer Ruvalcaba, to the [facility]. Please see a copy of my visit note below.    HEMATOLOGY CLINIC VISIT    NOTE:  Due to the ongoing COVID-19 pandemic, this visit was conducted by video, with the patient's approval.    Esmer Ruvalcaba is an 86-year-old woman who was scheduled for a routine follow-up visit today for IgM lambda monoclonal gammopathy.  Her daughters joined in today's visit.    She was found in January 2019 to have a monoclonal gammopathy.  Work-up including CT scans and bone marrow biopsy revealed no evidence of underlying lymphoma, myeloma, or other hematologic malignancy.  At the time of diagnosis, the monoclonal protein measured 2.0 g/dL.  Thus far, she has not required treatment.    She also has a history of iron deficiency anemia felt secondary to chronic gastritis.  She has been on oral iron intermittently over the years with variable success.  She received Injectafer in February of 2021, and developed a hypertensive reaction after the first infusion, so the second infusion was not given.     Overall, she continues to feel about the same.  She has good days and bad days.  She continuously bothered by diffuse musculoskeletal aches and pains.  These are very longstanding.  She has chronic GI issues consisting of intermittent diarrhea and abdominal pain.  These are also very longstanding.  Her appetite is okay and her weight has been stable.  No blood in her stools.  The rest of the review of systems is negative.      Physical exam: Detailed exam not performed (video visit).    Labs: Recent labs obtained through her local clinic in were reviewed.    CBC shows a white count of 4.2 with a normal differential, hemoglobin 10.5, MCV 84.5, platelet count 192,000.  Over the last 2 to 3 years her hemoglobin has ranged from approximately  10.5-12.  Serum ferritin increased to 201 following the iron infusion back in February 2021.  By June it had drifted down to 54, and is currently 28.  In spite of the IV iron infusion, her hemoglobin only increased to a maximum of 11.2.    Chemistry panel shows normal liver and renal function.  Calcium is normal at 9.4.    Serum protein electrophoresis continues to demonstrate the presence of a monoclonal IgM lambda, currently measuring 3.4 g/dL.Total IgM level is now 4667.      ASSESSMENT / PLAN:  IgM monoclonal gammopathy.  The monoclonal protein and total IgM level continue to slowly increase.  It is still not clear that she is having any symptoms that we can directly attribute to this.  Overall, she clinically appears to be doing the same now as when the monoclonal gammopathy was first discovered.  She does not have any symptoms suggestive of hyperviscosity.    We again discussed whether or not we should consider treating this.  Prior to doing so, ideally we would repeat a bone marrow biopsy and CT scans.  However, considering her age and overall frail clinical status, continued observation as long as she appears to remain asymptomatic would also be reasonable.    We decided today to continue to observe, and arrange for an in person visit here in about 4 months.  In the meantime, if she has any new symptoms or change in her overall clinical condition, her daughters will notify us.        Total time 40 minutes, including review of medical records and labs, video visit, and documentation.      Tyler Mcmanus MD  Professor of Medicine  Division of Hematology, Oncology, and Transplantation  Director, [facility]

## 2021-10-28 NOTE — PROGRESS NOTES
Esmer is a 86 year old who is being evaluated via a billable video visit.      Esmer Ruvalcaba stated she is in the state of [place] for the visit today.      How would you like to obtain your AVS? Mail a copy  If the video visit is dropped, the invitation should be resent by: Text to cell phone: [phone]  Will anyone else be joining your video visit? Yes: daughters. How would they like to receive their invitation? Other e-mail: N/A        Video-Visit Details    Type of service:  Video Visit    Originating Location (pt. Location): Other -daughter's house    Distant Location (provider location):  Office    Platform used for Video Visit: Priscila Schafer, Virtual Visit Facilitator

## 2021-10-28 NOTE — NURSING NOTE
Esmer Ruvalcaba 86 year old female presents today to discuss abdominal pain, diarrhea and fatigue for the last couple of months.    Angie Schafer, Virtual Facilitator

## 2021-11-04 ENCOUNTER — PATIENT OUTREACH (OUTPATIENT)
Dept: CARE COORDINATION | Facility: CLINIC | Age: 86
End: 2021-11-04

## 2021-11-04 NOTE — PROGRESS NOTES
Oncology Distress Screening Follow-up  Clinical Social Work  [facility]    Identified Concern and Score From Distress Screenin. How concerned are you about feeling anxious or very scared?   7Abnormal        Date of Distress Screening: 10/28/21      Data: Esmer is a 86 year old patient who was seen in the Hematology clinic to discuss abdominal pain, diarrhea and fatigue for the last couple of months. At time of last visit, Patient scored positive on distress screen.  called Patient today with intention of introducing them to psychosocial services and support, and following up on elevated distress.        Intervention/Education Provided: Phone call to patient about distress screening. No answer - message left. Provided contact information in order to reach writer.       Follow-up Required: Will remain available for support and await patient's return call.      PRASANNA Sigala,SW  Hematology/Oncology Social Worker  Phone:[phone] Pager: [phone]

## 2021-12-02 DIAGNOSIS — D47.2 MONOCLONAL GAMMOPATHY: Primary | ICD-10-CM

## 2021-12-03 NOTE — PROGRESS NOTES
HEMATOLOGY CLINIC VISIT    NOTE:  Due to the ongoing COVID-19 pandemic, this visit was conducted by video, with the patient's approval.      Esmer Ruvalcaba is an 86-year-old woman who was scheduled for a routine follow-up visit today for IgM lambda monoclonal gammopathy.  Her daughters joined in today's visit.    She was found in January 2019 to have a monoclonal gammopathy.  Work-up including CT scans and bone marrow biopsy revealed no evidence of underlying lymphoma, myeloma, or other hematologic malignancy.  At the time of diagnosis, the monoclonal protein measured 2.0 g/dL.  Thus far, she has not required treatment.    She also has a history of iron deficiency anemia felt secondary to chronic gastritis.  She has been on oral iron intermittently over the years with variable success.  She received Injectafer in February of 2021, and developed a hypertensive reaction after the first infusion, so the second infusion was not given.     Overall, she continues to feel about the same.  She has good days and bad days.  She continuously bothered by diffuse musculoskeletal aches and pains.  These are very longstanding.  She has chronic GI issues consisting of intermittent diarrhea and abdominal pain.  These are also very longstanding.  Her appetite is okay and her weight has been stable.  No blood in her stools.  The rest of the review of systems is negative.      Physical exam: Detailed exam not performed (video visit).    Labs: Recent labs obtained through her local clinic in were reviewed.    CBC shows a white count of 4.2 with a normal differential, hemoglobin 10.5, MCV 84.5, platelet count 192,000.  Over the last 2 to 3 years her hemoglobin has ranged from approximately 10.5-12.  Serum ferritin increased to 201 following the iron infusion back in February 2021.  By June it had drifted down to 54, and is currently 28.  In spite of the IV iron infusion, her hemoglobin only increased to a maximum of 11.2.    Chemistry  panel shows normal liver and renal function.  Calcium is normal at 9.4.    Serum protein electrophoresis continues to demonstrate the presence of a monoclonal IgM lambda, currently measuring 3.4 g/dL.Total IgM level is now 4667.      ASSESSMENT / PLAN:  IgM monoclonal gammopathy.  The monoclonal protein and total IgM level continue to slowly increase.  It is still not clear that she is having any symptoms that we can directly attribute to this.  Overall, she clinically appears to be doing the same now as when the monoclonal gammopathy was first discovered.  She does not have any symptoms suggestive of hyperviscosity.    We again discussed whether or not we should consider treating this.  Prior to doing so, ideally we would repeat a bone marrow biopsy and CT scans.  However, considering her age and overall frail clinical status, continued observation as long as she appears to remain asymptomatic would also be reasonable.    We decided today to continue to observe, and arrange for an in person visit here in about 4 months.  In the meantime, if she has any new symptoms or change in her overall clinical condition, her daughters will notify us.        Total time 40 minutes, including review of medical records and labs, video visit, and documentation.      Tyler Mcmanus MD  Professor of Medicine  Division of Hematology, Oncology, and Transplantation  Director, [facility]

## 2022-02-25 ENCOUNTER — PATIENT OUTREACH (OUTPATIENT)
Dept: ONCOLOGY | Facility: CLINIC | Age: 87
End: 2022-02-25
Payer: COMMERCIAL

## 2022-03-03 ENCOUNTER — TRANSFERRED RECORDS (OUTPATIENT)
Dept: HEALTH INFORMATION MANAGEMENT | Facility: CLINIC | Age: 87
End: 2022-03-03
Payer: COMMERCIAL

## 2022-03-10 ENCOUNTER — OFFICE VISIT (OUTPATIENT)
Dept: HEMATOLOGY | Facility: CLINIC | Age: 87
End: 2022-03-10
Attending: INTERNAL MEDICINE
Payer: COMMERCIAL

## 2022-03-10 VITALS
OXYGEN SATURATION: 97 % | HEART RATE: 72 BPM | WEIGHT: 124.7 LBS | BODY MASS INDEX: 23.54 KG/M2 | TEMPERATURE: 98.1 F | HEIGHT: 61 IN | SYSTOLIC BLOOD PRESSURE: 180 MMHG | DIASTOLIC BLOOD PRESSURE: 92 MMHG | RESPIRATION RATE: 15 BRPM

## 2022-03-10 DIAGNOSIS — D47.2 IGM MONOCLONAL GAMMOPATHY OF UNCERTAIN SIGNIFICANCE: Primary | ICD-10-CM

## 2022-03-10 PROCEDURE — G0463 HOSPITAL OUTPT CLINIC VISIT: HCPCS

## 2022-03-10 PROCEDURE — 99215 OFFICE O/P EST HI 40 MIN: CPT | Performed by: INTERNAL MEDICINE

## 2022-03-10 ASSESSMENT — PAIN SCALES - GENERAL: PAINLEVEL: NO PAIN (0)

## 2022-03-26 ENCOUNTER — HEALTH MAINTENANCE LETTER (OUTPATIENT)
Age: 87
End: 2022-03-26

## 2022-04-10 NOTE — PROGRESS NOTES
HEMATOLOGY CLINIC VISIT      Esmer Ruvalcaba is an 87-year-old woman who was scheduled for a routine follow-up visit today for IgM lambda monoclonal gammopathy.  She was accompanied by 3 family members for today's visit.    Background history:  She was found in January 2019 to have a monoclonal gammopathy.  Work-up including CT scans and bone marrow biopsy revealed no evidence of underlying lymphoma, myeloma, or other hematologic malignancy.  At the time of diagnosis, the monoclonal protein measured 2.0 g/dL.  Thus far, she has not required treatment.    She also has a history of iron deficiency anemia felt secondary to chronic gastritis.  She has been on oral iron intermittently over the years with variable success.  She received Injectafer in February of 2021, and developed a hypertensive reaction after the first infusion, so the second infusion was not given.  She is currently taking oral iron, 1 tablet daily.  She is tolerating this without any difficulty.    Interval history:  Overall, she reports feeling about the same.  She has chronic fatigue and diffuse musculoskeletal aches and pains which are longstanding and have not noticeably changed since we began following her here approximately 3 years ago.    A new issue which she describes today is shooting pain in the arms and legs which she says occur on a daily basis, sometimes several times per day.  They seem to have been increasing in frequency and/or severity over the last few months.  She also notes in the morning that she has episodes of mild confusion and at times dizziness or disequilibrium.  She has not fallen.  Her family has also noticed these issues.  No headache or any other new neurologic symptoms.    She denies unintentional weight loss, fevers, or night sweats.  No new lumps or bumps.  Seems to be eating and sleeping reasonably well.  No specific GI or  symptoms.  No cough, chest pain, or shortness of breath that is new.     Physical exam:   She  looks her usual self.  She is alert and oriented.  Head and neck exam is normal.  Lungs are clear.  Cardiovascular exam reveals a regular rate and rhythm, normal S1-S2, no rub, gallop, or murmur.  Abdomen soft, nondistended and nontender.  No palpable hepatosplenomegaly.  She has no palpable lymphadenopathy.  No lower extremity edema.  Neurologic examination is grossly nonfocal.    Labs:   Labs obtained at her local clinic a few days ago include a CBC which showed a white count of 4.2, hemoglobin 9.8, platelet count 231,000.  Leukocyte differential is normal.  Serum electrolytes including calcium are normal, creatinine 0.84.    Serum protein electrophoresis demonstrates a monoclonal protein currently measuring 3.6 g/dL.  Total IgM level is 4649.      ASSESSMENT / PLAN:  IgM monoclonal gammopathy.  Over the last 3 years, the IgM monoclonal gammopathy has continued to slowly progress with monoclonal protein and total IgM levels that continue to drift up.  We continue to struggle to determine whether or not she is having any symptoms from this.  Overall, her clinical status does not seem to have changed much since I first met her.  However, she is now describing some shooting pains in her arms and legs, episodes of mild confusion, and dizziness / disequilibrium.  The symptoms seem to have developed over the last few months.  They could be related to hyperviscosity and/or the elevated IgM monoclonal protein level.    We discussed whether we should repeat CT scans and a bone marrow biopsy to determine whether there has been any additional objective documentation that her condition has progressed.  While she is willing to do these, considering her advanced age and overall relatively frail clinical status, it is not clear that a bone marrow biopsy is necessarily in her best interest.  Even if there was not a significant increase in the number of monoclonal plasma cells in her bone marrow, the overall clinical and  laboratory picture here is still such that we would likely consider initiating treatment.    We discussed the treatment options including rituximab versus an oral agent such as ibrutinib.  Both have advantages and disadvantages.  One concern about the use of rituximab as it can sometimes cause the IgM level to increase rapidly during the first cycle such that plasma exchange or concurrent chemotherapy is usually needed.  Thus, this is probably not the preferred option for her.  On the other hand, ibrutinib would be much easier to administer given that its oral.  We discussed that we have little data in patients in her age group although we have used this medication in a small number of individuals in their 80s, sometimes at reduced dose to help mitigate side effects but again, there are no firm guidelines in this regard.    Our plan is to see if we can secure insurance coverage for ibrutinib without any additional diagnostic information (CT scans and or bone marrow biopsy).  If so, then we will proceed in that direction.  I will contact Esmer and her family by phone once we have clarity with regard to that issue.      Total time 40 minutes, including review of medical records and labs, video visit, and documentation.      Tyler Mcmanus MD  Professor of Medicine  Division of Hematology, Oncology, and Transplantation  Director, [facility]

## 2022-04-11 ENCOUNTER — TELEPHONE (OUTPATIENT)
Dept: ONCOLOGY | Facility: CLINIC | Age: 87
End: 2022-04-11
Payer: COMMERCIAL

## 2022-04-11 NOTE — TELEPHONE ENCOUNTER
I spoke with Pedro Luis at [facility]. Imbruvica cannot be approved without a definitive diagnosis. The PA request was withdrawn.     Thank you,    Kalina Huerta  Oncology Pharmacy Liaison II  [email or web address]  Phone: [phone]  Fax: [phone]

## 2022-04-11 NOTE — TELEPHONE ENCOUNTER
PA Initiation    Medication: Imbruvica PA  Insurance Company: [facility] - Phone [phone] Fax [phone]  Pharmacy Filling the Rx:    Filling Pharmacy Phone:    Filling Pharmacy Fax:    Start Date: 4/11/2022    [id number]      Kalina Huerta  Oncology Pharmacy Liaison II  [email or web address]  Phone: [phone]  Fax: [phone]

## 2022-09-18 ENCOUNTER — HEALTH MAINTENANCE LETTER (OUTPATIENT)
Age: 87
End: 2022-09-18

## 2023-01-31 ENCOUNTER — PATIENT OUTREACH (OUTPATIENT)
Dept: ONCOLOGY | Facility: CLINIC | Age: 88
End: 2023-01-31
Payer: COMMERCIAL

## 2023-01-31 NOTE — TELEPHONE ENCOUNTER
[facility]: Hematology                                                                                          Received voice mail from daughter Patricia.  She has questions and concerns about the second opinion they received out in [place].  Spoke with Dr. Mcmanus regarding these concerns.  His responses are summarized below.  This information was summarized on a voicemail back to Patricia.    1. Was  Viscosity ever checked? Why not?   Viscosity levels are not considered to be reliable. They often give false results both negative and positive, they are not used as a stand alone diagnostic tool. Bone marrow biopsy is the way to positively diagnose  this type of condition. Imaging also contributes to the work-up.    2.Starting Imbruvica 420mg tablet     Imbruvica would be the treatment of choice for this condition.  We did not pursue this since we did not have a definitive diagnosis and based on our experience within our clinic, obtaining coverage for the medication would be denied.  We also consulted with our malignant hematologist specialists, they agreed we would not be able to get drug approved without a positive diagnosis which would require bone marrow biopsy.    3. Ongoing symptoms  I have known Esmer for several years, and although she has reported symptoms, they seem to have not changed significantly over the years. They are the same as when I first met her.  I feel that they are multifactorial in nature.     4. Current recommendations  I have reviewed her records in care everywhere.  Esmer's labs have changed, enough to warrant treatment, if patient so desires. The plan that has been outlined is consistent to what I would recommend.  However, Esmer and her family need to can take into consideration side effects of Imbruvica and have discussions on what is important to Esmer and her quality of life      Abby Cordoba LPN  Hematology Care Coordination  [phone]

## 2023-06-26 NOTE — PROGRESS NOTES
Opened in error please disregard.     Sparkle Travis  MSN, RN, PHN  [facility]  Office: [phone]  Fax: [phone]

## 2023-08-10 ENCOUNTER — ONCOLOGY VISIT (OUTPATIENT)
Dept: ONCOLOGY | Facility: CLINIC | Age: 88
End: 2023-08-10
Attending: INTERNAL MEDICINE
Payer: COMMERCIAL

## 2023-08-10 VITALS
BODY MASS INDEX: 21.39 KG/M2 | DIASTOLIC BLOOD PRESSURE: 86 MMHG | SYSTOLIC BLOOD PRESSURE: 195 MMHG | OXYGEN SATURATION: 97 % | HEART RATE: 67 BPM | TEMPERATURE: 98.2 F | WEIGHT: 113.2 LBS

## 2023-08-10 DIAGNOSIS — C88.00 WALDENSTROM MACROGLOBULINEMIA: Primary | ICD-10-CM

## 2023-08-10 PROCEDURE — 99215 OFFICE O/P EST HI 40 MIN: CPT | Performed by: INTERNAL MEDICINE

## 2023-08-10 PROCEDURE — G0463 HOSPITAL OUTPT CLINIC VISIT: HCPCS | Performed by: INTERNAL MEDICINE

## 2023-08-10 RX ORDER — METOPROLOL SUCCINATE 25 MG/1
25 TABLET, EXTENDED RELEASE ORAL
COMMUNITY
Start: 2023-08-03

## 2023-08-10 RX ORDER — METOPROLOL SUCCINATE 50 MG/1
50 TABLET, EXTENDED RELEASE ORAL AT BEDTIME
COMMUNITY

## 2023-08-10 RX ORDER — FAMOTIDINE 20 MG/1
20 TABLET, FILM COATED ORAL
COMMUNITY

## 2023-08-10 RX ORDER — FERROUS SULFATE 324(65)MG
65 TABLET, DELAYED RELEASE (ENTERIC COATED) ORAL
COMMUNITY

## 2023-08-10 RX ORDER — LEVOTHYROXINE SODIUM 50 UG/1
50 TABLET ORAL
COMMUNITY

## 2023-08-10 ASSESSMENT — PAIN SCALES - GENERAL: PAINLEVEL: SEVERE PAIN (7)

## 2023-08-10 NOTE — PROGRESS NOTES
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  She was last seen here in March 2022, but has been followed in her local hematology / oncology clinic in the interim.    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  Work-up including CT scans and bone marrow biopsy revealed no evidence of underlying lymphoma, myeloma, or other hematologic malignancy.  At the time of diagnosis, the monoclonal protein measured 2.0 g/dL and the total IgM level was 2130.  Observation was recommended.    Over time, the monoclonal protein and total IgM levels trended upward, but we struggled to determine whether or not she was having any symptoms related to this underlying process, although the total IgM level eventually reached a point where we thought it was likely.  We discussed a variety of treatment options and felt that ibrutinib was the best fit for her.  However, we were unable to secure insurance coverage.    In January / February 2023 restaging evaluations were performed.  CT scans showed no evidence of lymphadenopathy or hepatosplenomegaly.  PET scan showed no bony lesions.  Bone marrow biopsy was slightly hypercellular for age (20 to 30%) with trilineage hematopoiesis.  There was no evidence of myelodysplasia.  There was evidence of a plasma cell neoplasm with lambda light chain restricted plasma cells, although the plasma cell burden was quite low (0.3% on the bone marrow aspirate and 2% on the core biopsy specimens).  Congo red stain was negative for amyloid.  MYD88 mutation testing was negative.    Based on the results of the testing outlined above, a definitive diagnosis of Waldenström macroglobulinemia could not be established, although we and her local hematology / oncology physicians all felt this was the most likely diagnosis.  She was eventually  able to secure insurance coverage for ibrutinib through her local clinic, and started this in April 2023.  However, it was stopped after 1 week secondary to hyponatremia and hypertension.  However, upon further review, both of these issues were pre-existing and ultimately felt to be unlikely related to the medication.  After some time for reconsideration, she eventually restarted ibrutinib about a month ago, in mid July 2023.  She reports today that she is only taking 1 tablet daily, not 2 as had been prescribed.    Overall she feels well.  She is living with her son.  She remains active doing housework and cooking.  She has not had any fevers, night sweats, or unintentional weight loss.  She continues to struggle with labile blood pressures and has been on a variety of blood pressure medication regimens to try to control this.  Again this has been a longstanding issue, and she continues to work with her local primary care physician and cardiologist to manage this.    She also has a history of iron deficiency anemia felt secondary to chronic gastritis.  She has been on oral iron intermittently over the years with variable success.  She has received IV iron in the past, most recently in March 2023.  On more than 1 occasion when receiving IV iron, she has had episodes of acute hypertension and has not completed the whole course of therapy.      Physical exam:   She looks well today.  Detailed exam was not performed.    Labs:   Labs obtained at her local clinic in late July 2023 include a CBC which showed a white count of 4.9, hemoglobin 10.0, platelet count 168,000.  Serum electrolytes including calcium were normal, creatinine 0.81.  Total protein was elevated at 10.1, total IgM level 5809, serum viscosity 3.6 (normal <1.5).      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  Esmer was found in January 2019 to have an IgM lambda monoclonal gammopathy.  Over the last 4-5 years, her monoclonal protein and  total IgM levels have slowly but steadily increased.  Over the last year or so, her total IgM level has been in the 5318-9313 range, and she has had consistently elevated serum viscosity which is not surprising.    Restaging evaluation performed in January and February 2023 did not reveal definitive evidence of Waldenström macroglobulinemia, but considering the overall clinical picture here, we and her local hematology/oncology physicians continue to feel that this is the most likely diagnosis.  In addition, it seems very likely that she is having some symptoms related to this.    I reiterated to Esmer and her daughter today that I was in agreement with her local physicians in that treating her with ibrutinib was appropriate.  She is currently taking only 1 tablet daily, but seems to be tolerating it well.  Her local physicians have outlined a plan of follow-up labs every 2 weeks, and clinic visits every 4 weeks which also seems appropriate.    We also discussed that if there is not any clear improvement in her numbers, carefully increasing the dose of the medication is recommended, with the goal of significantly decreasing her total IgM level so that we can then reevaluate and determine whether or not she really was having any symptoms related to this.  I explained that this may take some time, particularly if we proceed carefully which seems prudent considering her age and the overall tempo of this condition over the last 4 to 5 years.    She has had longstanding hypertension, with likely a significant component of whitecoat hypertension.  There has been concern that this has been exacerbated by the ibrutinib, although based on my review of the available records it is not entirely clear this is the case.  Nevertheless, I encouraged her to continue working closely with her local physicians to manage her blood pressure.  I also pointed out that this is another potential manifestation of her elevated IgM level and  serum viscosity.    Given that she has established ongoing close follow-up with her local physicians, we will continue to see her on an as-needed basis.    Total time 40 minutes, including review of medical records and labs, clinic visit, and documentation.      Tyler Mcmanus MD  Professor of Medicine  Division of Hematology, Oncology, and Transplantation  Director, [facility]

## 2023-08-10 NOTE — NURSING NOTE
"Oncology Rooming Note    August 10, 2023 3:08 PM   Esmer Ruvalcaba is a 88 year old female who presents for:    Chief Complaint   Patient presents with    Oncology Clinic Visit     Return visit     Initial Vitals: BP (!) 195/86 (BP Location: Right arm, Patient Position: Sitting, Cuff Size: Adult Small)   Pulse 67   Temp 98.2  F (36.8  C) (Oral)   Wt 51.3 kg (113 lb 3.2 oz)   SpO2 97%   BMI 21.39 kg/m   Estimated body mass index is 21.39 kg/m  as calculated from the following:    Height as of 3/10/22: 1.549 m (5' 1\").    Weight as of this encounter: 51.3 kg (113 lb 3.2 oz). Body surface area is 1.49 meters squared.  Severe Pain (7) Comment: Data Unavailable   No LMP recorded.  Allergies reviewed: Yes  Medications reviewed: Yes    Medications: Medication refills not needed today.  Pharmacy name entered into [facility]: [facility] [facility], [place] - [address]    Clinical concerns: BP is high: 195/86      Jazmyne Rowe, EMT  8/10/2023              "

## 2023-08-10 NOTE — LETTER
8/10/2023         RE: Esmer Ruvalcaba  [address] [place] [address]        Dear Colleague,    Thank you for referring your patient, Esmer Ruvalcaba, to the [facility]. Please see a copy of my visit note below.      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  She was last seen here in March 2022, but has been followed in her local hematology / oncology clinic in the interim.    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  Work-up including CT scans and bone marrow biopsy revealed no evidence of underlying lymphoma, myeloma, or other hematologic malignancy.  At the time of diagnosis, the monoclonal protein measured 2.0 g/dL and the total IgM level was 2130.  Observation was recommended.    Over time, the monoclonal protein and total IgM levels trended upward, but we struggled to determine whether or not she was having any symptoms related to this underlying process, although the total IgM level eventually reached a point where we thought it was likely.  We discussed a variety of treatment options and felt that ibrutinib was the best fit for her.  However, we were unable to secure insurance coverage.    In January / February 2023 restaging evaluations were performed.  CT scans showed no evidence of lymphadenopathy or hepatosplenomegaly.  PET scan showed no bony lesions.  Bone marrow biopsy was slightly hypercellular for age (20 to 30%) with trilineage hematopoiesis.  There was no evidence of myelodysplasia.  There was evidence of a plasma cell neoplasm with lambda light chain restricted plasma cells, although the plasma cell burden was quite low (0.3% on the bone marrow aspirate and 2% on the core biopsy specimens).  Congo red stain was negative for amyloid.  MYD88 mutation testing was negative.    Based  on the results of the testing outlined above, a definitive diagnosis of Waldenström macroglobulinemia could not be established, although we and her local hematology / oncology physicians all felt this was the most likely diagnosis.  She was eventually able to secure insurance coverage for ibrutinib through her local clinic, and started this in April 2023.  However, it was stopped after 1 week secondary to hyponatremia and hypertension.  However, upon further review, both of these issues were pre-existing and ultimately felt to be unlikely related to the medication.  After some time for reconsideration, she eventually restarted ibrutinib about a month ago, in mid July 2023.  She reports today that she is only taking 1 tablet daily, not 2 as had been prescribed.    Overall she feels well.  She is living with her son.  She remains active doing housework and cooking.  She has not had any fevers, night sweats, or unintentional weight loss.  She continues to struggle with labile blood pressures and has been on a variety of blood pressure medication regimens to try to control this.  Again this has been a longstanding issue, and she continues to work with her local primary care physician and cardiologist to manage this.    She also has a history of iron deficiency anemia felt secondary to chronic gastritis.  She has been on oral iron intermittently over the years with variable success.  She has received IV iron in the past, most recently in March 2023.  On more than 1 occasion when receiving IV iron, she has had episodes of acute hypertension and has not completed the whole course of therapy.      Physical exam:   She looks well today.  Detailed exam was not performed.    Labs:   Labs obtained at her local clinic in late July 2023 include a CBC which showed a white count of 4.9, hemoglobin 10.0, platelet count 168,000.  Serum electrolytes including calcium were normal, creatinine 0.81.  Total protein was elevated at 10.1,  total IgM level 5809, serum viscosity 3.6 (normal <1.5).      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  Esmer was found in January 2019 to have an IgM lambda monoclonal gammopathy.  Over the last 4-5 years, her monoclonal protein and total IgM levels have slowly but steadily increased.  Over the last year or so, her total IgM level has been in the 1983-3392 range, and she has had consistently elevated serum viscosity which is not surprising.    Restaging evaluation performed in January and February 2023 did not reveal definitive evidence of Waldenström macroglobulinemia, but considering the overall clinical picture here, we and her local hematology/oncology physicians continue to feel that this is the most likely diagnosis.  In addition, it seems very likely that she is having some symptoms related to this.    I reiterated to Esmer and her daughter today that I was in agreement with her local physicians in that treating her with ibrutinib was appropriate.  She is currently taking only 1 tablet daily, but seems to be tolerating it well.  Her local physicians have outlined a plan of follow-up labs every 2 weeks, and clinic visits every 4 weeks which also seems appropriate.    We also discussed that if there is not any clear improvement in her numbers, carefully increasing the dose of the medication is recommended, with the goal of significantly decreasing her total IgM level so that we can then reevaluate and determine whether or not she really was having any symptoms related to this.  I explained that this may take some time, particularly if we proceed carefully which seems prudent considering her age and the overall tempo of this condition over the last 4 to 5 years.    She has had longstanding hypertension, with likely a significant component of whitecoat hypertension.  There has been concern that this has been exacerbated by the ibrutinib, although based on my review of the available records it  is not entirely clear this is the case.  Nevertheless, I encouraged her to continue working closely with her local physicians to manage her blood pressure.  I also pointed out that this is another potential manifestation of her elevated IgM level and serum viscosity.    Given that she has established ongoing close follow-up with her local physicians, we will continue to see her on an as-needed basis.    Total time 40 minutes, including review of medical records and labs, clinic visit, and documentation.      Tyler Mcmanus MD  Professor of Medicine  Division of Hematology, Oncology, and Transplantation  Director, [facility]

## 2024-02-25 ENCOUNTER — HEALTH MAINTENANCE LETTER (OUTPATIENT)
Age: 89
End: 2024-02-25

## 2025-03-15 ENCOUNTER — HEALTH MAINTENANCE LETTER (OUTPATIENT)
Age: OVER 89
End: 2025-03-15